# Patient Record
Sex: MALE | Race: WHITE | NOT HISPANIC OR LATINO | Employment: OTHER | URBAN - METROPOLITAN AREA
[De-identification: names, ages, dates, MRNs, and addresses within clinical notes are randomized per-mention and may not be internally consistent; named-entity substitution may affect disease eponyms.]

---

## 2017-03-20 ENCOUNTER — OFFICE VISIT (OUTPATIENT)
Dept: FAMILY MEDICINE CLINIC | Facility: CLINIC | Age: 77
End: 2017-03-20

## 2017-03-20 VITALS
TEMPERATURE: 97.8 F | OXYGEN SATURATION: 99 % | HEART RATE: 56 BPM | WEIGHT: 181 LBS | HEIGHT: 72 IN | DIASTOLIC BLOOD PRESSURE: 70 MMHG | BODY MASS INDEX: 24.52 KG/M2 | SYSTOLIC BLOOD PRESSURE: 132 MMHG

## 2017-03-20 DIAGNOSIS — R41.3 MEMORY IMPAIRMENT: ICD-10-CM

## 2017-03-20 DIAGNOSIS — I10 ESSENTIAL HYPERTENSION: Primary | ICD-10-CM

## 2017-03-20 PROCEDURE — 99213 OFFICE O/P EST LOW 20 MIN: CPT | Performed by: NURSE PRACTITIONER

## 2017-03-20 RX ORDER — ESCITALOPRAM OXALATE 20 MG/1
20 TABLET ORAL DAILY
Qty: 90 TABLET | Refills: 1 | Status: SHIPPED | OUTPATIENT
Start: 2017-03-20 | End: 2017-03-27 | Stop reason: SDUPTHER

## 2017-03-20 RX ORDER — PRAVASTATIN SODIUM 40 MG
40 TABLET ORAL DAILY
Qty: 90 TABLET | Refills: 1 | Status: SHIPPED | OUTPATIENT
Start: 2017-03-20 | End: 2017-04-18 | Stop reason: SDUPTHER

## 2017-03-20 RX ORDER — LORATADINE 10 MG/1
10 TABLET ORAL DAILY
Qty: 90 TABLET | Refills: 1 | Status: SHIPPED | OUTPATIENT
Start: 2017-03-20 | End: 2017-03-27 | Stop reason: SDUPTHER

## 2017-03-20 RX ORDER — CLOPIDOGREL BISULFATE 75 MG/1
75 TABLET ORAL DAILY
Qty: 90 TABLET | Refills: 1 | Status: SHIPPED | OUTPATIENT
Start: 2017-03-20 | End: 2017-03-27 | Stop reason: SDUPTHER

## 2017-03-20 RX ORDER — AMLODIPINE BESYLATE 5 MG/1
TABLET ORAL
Qty: 135 TABLET | Refills: 1 | Status: SHIPPED | OUTPATIENT
Start: 2017-03-20 | End: 2017-03-27 | Stop reason: SDUPTHER

## 2017-03-20 RX ORDER — OLOPATADINE HYDROCHLORIDE 2 MG/ML
1 SOLUTION/ DROPS OPHTHALMIC DAILY
Qty: 3 BOTTLE | Refills: 1 | Status: SHIPPED | OUTPATIENT
Start: 2017-03-20 | End: 2017-04-18 | Stop reason: SDUPTHER

## 2017-03-20 NOTE — PROGRESS NOTES
Subjective   Aakash Martinez is a 76 y.o. male.     HPI Comments: Patient's doing well    Recent neuropsych evaluation frustration    Follow-up neurology next month  No longer taking Aricept,  No longer taking medications for Parkinson's    Diagnoses and question    Blood pressures controlled    Pravachol for cholesterol    Depression anxiety better Lexapro    Still difficulty things you require steps  Such as     No longer drives      Hearing deficit  Doesn't want to wear hearing aids  Had them checked  Wife encouraging him to do so    May need glasses having checked out as well      Still has balance issues gait problems  Question as to if he has Parkinson's        Difficulty following simple step    May have 1 or 2 steps difficulty with frustration  Lexapro has helped dramatically    No longer takes Aricept discontinued by neurology         The following portions of the patient's history were reviewed and updated as appropriate: allergies, current medications, past family history, past social history, past surgical history and problem list.    Review of Systems   Constitutional: Negative.    HENT: Negative.    Respiratory: Negative.    Gastrointestinal: Negative.    Genitourinary: Negative.    Musculoskeletal: Positive for gait problem.   Neurological: Positive for numbness. Negative for dizziness, tremors, syncope, facial asymmetry, speech difficulty, light-headedness and headaches.   Psychiatric/Behavioral: The patient is nervous/anxious.        Objective   Physical Exam   Constitutional: He is oriented to person, place, and time. He appears well-developed.   HENT:   Head: Normocephalic.   Cardiovascular: Normal rate and regular rhythm.    Pulmonary/Chest: Effort normal and breath sounds normal.   Abdominal: Soft. Bowel sounds are normal.   Musculoskeletal:   Small steps  Possibly some shuffling  ?   Lymphadenopathy:     He has no cervical adenopathy.   Neurological: He is alert and oriented to person,  place, and time.   Psychiatric: His behavior is normal. Judgment and thought content normal.   Psychiatric normal pleasant alert oriented       Assessment/Plan   Aakash was seen today for follow-up.    Diagnoses and all orders for this visit:    Essential hypertension    Memory impairment    Other orders  -     pravastatin (PRAVACHOL) 40 MG tablet; Take 1 tablet by mouth Daily.  -     clopidogrel (PLAVIX) 75 MG tablet; Take 1 tablet by mouth Daily.  -     escitalopram (LEXAPRO) 20 MG tablet; Take 1 tablet by mouth Daily.  -     amLODIPine (NORVASC) 5 MG tablet; Take 1 1/2 tab daily  -     olopatadine (PATADAY) 0.2 % solution ophthalmic solution; Administer 1 drop to both eyes Daily. As needed eye allergies  -     loratadine (CLARITIN) 10 MG tablet; Take 1 tablet by mouth Daily. As needed for allergies                Continue Lexapro 20 mg  Continue same blood pressure medication  Follow-up with neurology  Please have results of neuropsychiatric testing sent to the office  Patient has some mild dementia suspect unlikely Alzheimer's however  Encouraged him to right some of the steps down, this will help him stay more independent  Falls precautions use cane  Recheck in 4 months otherwise

## 2017-03-27 RX ORDER — ESCITALOPRAM OXALATE 20 MG/1
20 TABLET ORAL DAILY
Qty: 90 TABLET | Refills: 1 | Status: SHIPPED | OUTPATIENT
Start: 2017-03-27 | End: 2017-03-27 | Stop reason: SDUPTHER

## 2017-03-27 RX ORDER — AMLODIPINE BESYLATE 5 MG/1
TABLET ORAL
Qty: 135 TABLET | Refills: 1 | Status: SHIPPED | OUTPATIENT
Start: 2017-03-27 | End: 2017-03-30 | Stop reason: SDUPTHER

## 2017-03-27 RX ORDER — CLOPIDOGREL BISULFATE 75 MG/1
75 TABLET ORAL DAILY
Qty: 90 TABLET | Refills: 1 | Status: SHIPPED | OUTPATIENT
Start: 2017-03-27 | End: 2017-03-27 | Stop reason: SDUPTHER

## 2017-03-27 RX ORDER — LORATADINE 10 MG/1
10 TABLET ORAL DAILY
Qty: 90 TABLET | Refills: 1 | Status: SHIPPED | OUTPATIENT
Start: 2017-03-27 | End: 2017-03-30 | Stop reason: SDUPTHER

## 2017-03-27 RX ORDER — LORATADINE 10 MG/1
10 TABLET ORAL DAILY
Qty: 90 TABLET | Refills: 1 | Status: SHIPPED | OUTPATIENT
Start: 2017-03-27 | End: 2017-03-27 | Stop reason: SDUPTHER

## 2017-03-27 RX ORDER — ESCITALOPRAM OXALATE 20 MG/1
20 TABLET ORAL DAILY
Qty: 90 TABLET | Refills: 1 | Status: SHIPPED | OUTPATIENT
Start: 2017-03-27 | End: 2017-03-30 | Stop reason: SDUPTHER

## 2017-03-27 RX ORDER — CLOPIDOGREL BISULFATE 75 MG/1
75 TABLET ORAL DAILY
Qty: 90 TABLET | Refills: 1 | Status: SHIPPED | OUTPATIENT
Start: 2017-03-27 | End: 2017-03-30 | Stop reason: SDUPTHER

## 2017-03-27 RX ORDER — AMLODIPINE BESYLATE 5 MG/1
TABLET ORAL
Qty: 135 TABLET | Refills: 1 | Status: SHIPPED | OUTPATIENT
Start: 2017-03-27 | End: 2017-03-27 | Stop reason: SDUPTHER

## 2017-03-30 RX ORDER — ESCITALOPRAM OXALATE 20 MG/1
20 TABLET ORAL DAILY
Qty: 90 TABLET | Refills: 1 | OUTPATIENT
Start: 2017-03-30 | End: 2017-04-18 | Stop reason: SDUPTHER

## 2017-03-30 RX ORDER — LORATADINE 10 MG/1
10 TABLET ORAL DAILY
Qty: 90 TABLET | Refills: 1 | OUTPATIENT
Start: 2017-03-30 | End: 2017-04-18 | Stop reason: SDUPTHER

## 2017-03-30 RX ORDER — CLOPIDOGREL BISULFATE 75 MG/1
75 TABLET ORAL DAILY
Qty: 90 TABLET | Refills: 1 | OUTPATIENT
Start: 2017-03-30 | End: 2017-04-18 | Stop reason: SDUPTHER

## 2017-03-30 RX ORDER — AMLODIPINE BESYLATE 5 MG/1
TABLET ORAL
Qty: 135 TABLET | Refills: 1 | OUTPATIENT
Start: 2017-03-30 | End: 2017-04-18 | Stop reason: SDUPTHER

## 2017-04-18 RX ORDER — AMLODIPINE BESYLATE 5 MG/1
TABLET ORAL
Qty: 135 TABLET | Refills: 1 | OUTPATIENT
Start: 2017-04-18 | End: 2017-04-19 | Stop reason: SDUPTHER

## 2017-04-18 RX ORDER — LORATADINE 10 MG/1
10 TABLET ORAL DAILY
Qty: 90 TABLET | Refills: 1 | OUTPATIENT
Start: 2017-04-18 | End: 2018-01-31

## 2017-04-18 RX ORDER — OMEPRAZOLE 20 MG/1
20 CAPSULE, DELAYED RELEASE ORAL DAILY
Qty: 90 CAPSULE | Refills: 2 | Status: SHIPPED | OUTPATIENT
Start: 2017-04-18 | End: 2017-10-19

## 2017-04-18 RX ORDER — CLOPIDOGREL BISULFATE 75 MG/1
75 TABLET ORAL DAILY
Qty: 90 TABLET | Refills: 1 | OUTPATIENT
Start: 2017-04-18 | End: 2017-04-19 | Stop reason: SDUPTHER

## 2017-04-18 RX ORDER — ESCITALOPRAM OXALATE 20 MG/1
20 TABLET ORAL DAILY
Qty: 90 TABLET | Refills: 1 | Status: SHIPPED | OUTPATIENT
Start: 2017-04-18 | End: 2017-10-19 | Stop reason: SDUPTHER

## 2017-04-18 RX ORDER — OLOPATADINE HYDROCHLORIDE 2 MG/ML
1 SOLUTION/ DROPS OPHTHALMIC DAILY
Qty: 3 BOTTLE | Refills: 1 | Status: SHIPPED | OUTPATIENT
Start: 2017-04-18 | End: 2018-01-31 | Stop reason: SDUPTHER

## 2017-04-18 RX ORDER — PRAVASTATIN SODIUM 40 MG
40 TABLET ORAL DAILY
Qty: 90 TABLET | Refills: 1 | Status: SHIPPED | OUTPATIENT
Start: 2017-04-18 | End: 2017-10-19 | Stop reason: SDUPTHER

## 2017-04-19 RX ORDER — CLOPIDOGREL BISULFATE 75 MG/1
75 TABLET ORAL DAILY
Qty: 90 TABLET | Refills: 1 | Status: SHIPPED | OUTPATIENT
Start: 2017-04-19 | End: 2017-10-19 | Stop reason: SDUPTHER

## 2017-04-19 RX ORDER — AMLODIPINE BESYLATE 5 MG/1
TABLET ORAL
Qty: 135 TABLET | Refills: 1 | Status: SHIPPED | OUTPATIENT
Start: 2017-04-19 | End: 2017-10-19 | Stop reason: SDUPTHER

## 2017-05-15 ENCOUNTER — OFFICE VISIT (OUTPATIENT)
Dept: FAMILY MEDICINE CLINIC | Facility: CLINIC | Age: 77
End: 2017-05-15

## 2017-05-15 VITALS
WEIGHT: 183 LBS | BODY MASS INDEX: 24.79 KG/M2 | SYSTOLIC BLOOD PRESSURE: 122 MMHG | DIASTOLIC BLOOD PRESSURE: 82 MMHG | TEMPERATURE: 97.9 F | HEART RATE: 46 BPM | HEIGHT: 72 IN | OXYGEN SATURATION: 98 %

## 2017-05-15 DIAGNOSIS — R26.81 GAIT INSTABILITY: ICD-10-CM

## 2017-05-15 DIAGNOSIS — R07.81 RIB PAIN ON RIGHT SIDE: ICD-10-CM
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
## 2017-05-15 DIAGNOSIS — I10 ESSENTIAL HYPERTENSION: ICD-10-CM

## 2017-05-15 PROCEDURE — 99213 OFFICE O/P EST LOW 20 MIN: CPT | Performed by: NURSE PRACTITIONER

## 2017-05-24 ENCOUNTER — HOSPITAL ENCOUNTER (OUTPATIENT)
Dept: PHYSICAL THERAPY | Facility: HOSPITAL | Age: 77
Setting detail: THERAPIES SERIES
Discharge: HOME | End: 2017-05-24

## 2017-05-24 DIAGNOSIS — Z91.81 RISK FOR FALLS: ICD-10-CM

## 2017-05-24 DIAGNOSIS — R26.89 BALANCE PROBLEM: ICD-10-CM

## 2017-05-24 DIAGNOSIS — R26.81 GAIT INSTABILITY: Primary | ICD-10-CM

## 2017-05-24 PROCEDURE — G8978 MOBILITY CURRENT STATUS: HCPCS | Performed by: PHYSICAL THERAPIST

## 2017-05-24 PROCEDURE — 97110 THERAPEUTIC EXERCISES: CPT | Performed by: PHYSICAL THERAPIST

## 2017-05-24 PROCEDURE — 97162 PT EVAL MOD COMPLEX 30 MIN: CPT | Performed by: PHYSICAL THERAPIST

## 2017-05-24 PROCEDURE — G8979 MOBILITY GOAL STATUS: HCPCS | Performed by: PHYSICAL THERAPIST

## 2017-05-26 ENCOUNTER — APPOINTMENT (OUTPATIENT)
Dept: PHYSICAL THERAPY | Facility: HOSPITAL | Age: 77
End: 2017-05-26

## 2017-05-30 ENCOUNTER — APPOINTMENT (OUTPATIENT)
Dept: PHYSICAL THERAPY | Facility: HOSPITAL | Age: 77
End: 2017-05-30

## 2017-06-01 ENCOUNTER — HOSPITAL ENCOUNTER (OUTPATIENT)
Dept: PHYSICAL THERAPY | Facility: HOSPITAL | Age: 77
Setting detail: THERAPIES SERIES
Discharge: HOME | End: 2017-06-01

## 2017-06-01 DIAGNOSIS — R26.81 GAIT INSTABILITY: Primary | ICD-10-CM

## 2017-06-01 DIAGNOSIS — R26.89 BALANCE PROBLEM: ICD-10-CM

## 2017-06-01 DIAGNOSIS — Z91.81 RISK FOR FALLS: ICD-10-CM

## 2017-06-01 PROCEDURE — 97110 THERAPEUTIC EXERCISES: CPT | Performed by: PHYSICAL THERAPIST

## 2017-06-01 PROCEDURE — 97112 NEUROMUSCULAR REEDUCATION: CPT | Performed by: PHYSICAL THERAPIST

## 2017-06-01 PROCEDURE — 97116 GAIT TRAINING THERAPY: CPT | Performed by: PHYSICAL THERAPIST

## 2017-06-01 NOTE — THERAPY TREATMENT NOTE
Outpatient Physical Therapy Ortho Treatment Note  [facility]     Patient Name: Aakash Martinez  : 1940  MRN: [id number]  Today's Date: 2017      Visit Date: 2017    Visit Dx:    ICD-10-CM ICD-9-CM   1. Gait instability R26.81 781.2   2. Balance problem R26.89 781.99   3. Risk for falls Z91.81 V15.88       Patient Active Problem List   Diagnosis   • Abnormal computerized tomography of brain   • Cognitive complaints   • Mixed anxiety depressive disorder   • Hypertension   • Obstructive sleep apnea syndrome   • Parkinsonism   • Depression   • Fatigue   • Memory impairment   • Peripheral neuropathy   • Sleep disorder   • Hypercholesterolemia   • Gait instability   • Falls        Past Medical History:   Diagnosis Date   • Acid reflux    • Angioedema 2016   • Aortic sclerosis    • Arthritis    • Depression    • ED (erectile dysfunction) of non-organic origin    • Hypercholesterolemia    • Hypertension    • Renal colic, bilateral     both kidneys        Past Surgical History:   Procedure Laterality Date   • HERNIA REPAIR Left     inguinal sliding, left with mesh   • NECK SURGERY     • OTHER SURGICAL HISTORY      RENAL LITHOTRIPSY   • TONSILLECTOMY                               PT Assessment/Plan       17 0922       PT Assessment    Assessment Comments Patient needs nearly constant cuing for performance of ex and to stay focused at times.  Attempted to perform alt UE with alt hip flexion in sitting but had extreme difficulty with this.  Encouraged to work on STS at home from higher surface but still a challenge vs working on it from a standard chair.  Consider adding israel israel, alt UE/LE arm swing in // bars, postural strengthening, activity to encourage less stiffness/rigidity and progress balance work with reduced UE support, consider semi tandem/tandem stance on level ground/foam pad, possibly trial of static balance on foam with eyes closed. Patient requires CG/SBA for safety and may  "progress out of // bars but would use gait belt and likely min/CGA for safety.    -RA     PT Plan    PT Plan Comments Continue skilled PT for core/LE strengthening, ROM/flexibility, transfer and balance training for improved safety with functional mobility/gait.  -RA       User Key  (r) = Recorded By, (t) = Taken By, (c) = Cosigned By    Initials Name Provider Type    MICHELLE Torres, PT Physical Therapist                    Exercises       06/01/17 0900          Subjective Comments    Subjective Comments spouse reports he's been doing ex except has real difficulty with STS.  -RA      Subjective Pain    Able to rate subjective pain? yes  -RA      Pre-Treatment Pain Level 3  -RA      Exercise 1    Exercise Name 1 See flowsheet in chart for ex details  -RA      Exercise 2    Exercise Name 2 --  -RA      Equipment 2 --  -RA      Resistance 2 --  -RA      Exercise 3    Exercise Name 3 seated LAQ, alt hip flexion (march)  -RA      Exercise 4    Exercise Name 4 sit<->stand with hands on thighs  -RA      Exercise 5    Exercise Name 5 : heel to toe, big steps, sidestepping, high knees, tandem 3-6 laps with 0-2 UE assist as needed   -RA      Exercise 6    Exercise Name 6 step taps, step overs 4\" 1UE support  -RA      Exercise 7    Exercise Name 7 blue foam, static balance, static balance NBOS, UE ball rotation, UE ball OH reach   -RA      Exercise 8    Exercise Name 8 standing heel/toe raises, standing hip abd w/ YTB   -RA        User Key  (r) = Recorded By, (t) = Taken By, (c) = Cosigned By    Initials Name Provider Type    MICHELLE Torres, PT Physical Therapist                               PT OP Goals       06/01/17 0900       PT Short Term Goals    STG 1 Patient will be independent and compliant with initial HEP for posture, ROM/flexibility, and basic strengthening.   -RA     STG 1 Progress Ongoing  -RA     STG 2 Patient/spouse will report >/= 25% increased ease with ADL's/transitional movements  -RA     STG 2 " Progress Ongoing  -RA     STG 3 Patient will demonstrate improving core/LE extremity strength and balance by improvement in the 5 Times Sit to Stand Test from 33 seconds to </= 28 seconds   -RA     STG 3 Progress Ongoing  -RA     STG 4 Patient will improve REEVES balance score to >/= 38/56  -RA     STG 4 Progress Ongoing  -RA     STG 5 Patient will be educated on and demonstrate ability to transition from floor to standing independently for increased ease getting up from floor/ground  -RA     STG 5 Progress Ongoing  -RA     Long Term Goals    LTG 1 Patient/spouse will be independent with established HEP for strength/stabilization and balance to facilitate self management of condition  -RA     LTG 1 Progress Ongoing  -RA     LTG 2 Patient will demonstrate improving core/LE extremity strength and balance by improvement in the 5 Times Sit to Stand Test from 33 seconds to </= 24 seconds   -RA     LTG 2 Progress Ongoing  -RA     LTG 3 Patient will show decreased falls risk and improved safety with functional movements by improvement in the Times Get Up and Go Test from 26 seconds to </= 18 seconds   -RA     LTG 3 Progress Ongoing  -RA     LTG 4 Patient will report reduced functional limitations on LEFS with score >= 34/80.  -RA     LTG 4 Progress Ongoing  -RA     LTG 5 Patient will improve REEVES balance score to >/= 44/56  -RA     LTG 5 Progress Ongoing  -RA     LTG 6 Patient will demonstrate improvement in gait mechanics to allow him to safely ambulate >/= 300' during 2 minute walk test  -     LTG 6 Progress Ongoing  -RA       User Key  (r) = Recorded By, (t) = Taken By, (c) = Cosigned By    Initials Name Provider Type    RA Suly Torres PT Physical Therapist                Therapy Education       06/01/17 0969          Therapy Education    Given Fall prevention and home safety;Mobility training;Posture/body mechanics  -RA      Program Reinforced  -RA      How Provided Verbal;Demonstration  -RA      Provided to  Patient  -RA      Level of Understanding Teach back education performed;Verbalized  -RA        User Key  (r) = Recorded By, (t) = Taken By, (c) = Cosigned By    Initials Name Provider Type    RA Suly Torres PT Physical Therapist                Time Calculation:   Start Time: 0921  Stop Time: 1012  Time Calculation (min): 51 min    Therapy Charges for Today     Code Description Service Date Service Provider Modifiers Qty    68242531526 HC GAIT TRAINING EA 15 MIN 6/1/2017 Suly Torres, PT GP 1    90226992037 HC PT THER PROC EA 15 MIN 6/1/2017 Suly Torres, PT GP 1    78125723594 HC PT NEUROMUSC RE EDUCATION EA 15 MIN 6/1/2017 Suly Torres, PT GP 1                    Suly Torres, PT  6/1/2017

## 2017-06-06 ENCOUNTER — APPOINTMENT (OUTPATIENT)
Dept: PHYSICAL THERAPY | Facility: HOSPITAL | Age: 77
End: 2017-06-06

## 2017-06-08 ENCOUNTER — HOSPITAL ENCOUNTER (OUTPATIENT)
Dept: PHYSICAL THERAPY | Facility: HOSPITAL | Age: 77
Setting detail: THERAPIES SERIES
Discharge: HOME | End: 2017-06-08

## 2017-06-08 DIAGNOSIS — Z91.81 RISK FOR FALLS: ICD-10-CM

## 2017-06-08 DIAGNOSIS — R26.81 GAIT INSTABILITY: Primary | ICD-10-CM

## 2017-06-08 DIAGNOSIS — R26.89 BALANCE PROBLEM: ICD-10-CM

## 2017-06-08 PROCEDURE — 97110 THERAPEUTIC EXERCISES: CPT

## 2017-06-08 PROCEDURE — 97116 GAIT TRAINING THERAPY: CPT

## 2017-06-08 NOTE — THERAPY TREATMENT NOTE
"    Outpatient Physical Therapy Ortho Treatment Note  [facility]     Patient Name: Aakash Martinez  : 1940  MRN: [id number]  Today's Date: 2017      Visit Date: 2017    Visit Dx:    ICD-10-CM ICD-9-CM   1. Gait instability R26.81 781.2   2. Balance problem R26.89 781.99   3. Risk for falls Z91.81 V15.88       Patient Active Problem List   Diagnosis   • Abnormal computerized tomography of brain   • Cognitive complaints   • Mixed anxiety depressive disorder   • Hypertension   • Obstructive sleep apnea syndrome   • Parkinsonism   • Depression   • Fatigue   • Memory impairment   • Peripheral neuropathy   • Sleep disorder   • Hypercholesterolemia   • Gait instability   • Falls        Past Medical History:   Diagnosis Date   • Acid reflux    • Angioedema 2016   • Aortic sclerosis    • Arthritis    • Depression    • ED (erectile dysfunction) of non-organic origin    • Hypercholesterolemia    • Hypertension    • Renal colic, bilateral     both kidneys        Past Surgical History:   Procedure Laterality Date   • HERNIA REPAIR Left     inguinal sliding, left with mesh   • NECK SURGERY     • OTHER SURGICAL HISTORY      RENAL LITHOTRIPSY   • TONSILLECTOMY                               PT Assessment/Plan       17 1154       PT Assessment    Assessment Comments Pt continues to require near constant cuing during gait and balance ex's.  He was able to perform sit--stand from 23\" seat height today without UE support. Pt will benefit from working on reciprocal arm swing and UE/LE coordination as well as ther ex and balance/gait training.  He would benefit from working on floor transfers and would likely require assist of 2 for safety at first.  -DEAN     PT Plan    PT Plan Comments Nustep UE/LE, ther ex, gait and balance training in bars (if out of bars add gait belt) sit--stand, may add floor transfers soon  -DEAN       User Key  (r) = Recorded By, (t) = Taken By, (c) = Cosigned By    Initials Name " "Provider Type    DEAN Matos, PT Physical Therapist                    Exercises       06/08/17 1000          Subjective Comments    Subjective Comments pt noted some discomfort in shoulders due to weather  -DEAN      Subjective Pain    Able to rate subjective pain? yes  -DEAN      Exercise 1    Exercise Name 1 See flowsheet in chart for ex details  -DEAN        User Key  (r) = Recorded By, (t) = Taken By, (c) = Cosigned By    Initials Name Provider Type    DEAN Matos, PT Physical Therapist                               PT OP Goals       06/08/17 0945       PT Short Term Goals    STG 1 Patient will be independent and compliant with initial HEP for posture, ROM/flexibility, and basic strengthening.   -DEAN     STG 1 Progress Ongoing  -     STG 1 Progress Comments requires 100% cuing  -DEAN     STG 2 Patient/spouse will report >/= 25% increased ease with ADL's/transitional movements  -DEAN     STG 2 Progress Ongoing  -     STG 2 Progress Comments pt demonstrated sit--stand from 23\" height mat table without using UE's  -DEAN     STG 3 Patient will demonstrate improving core/LE extremity strength and balance by improvement in the 5 Times Sit to Stand Test from 33 seconds to </= 28 seconds   -DEAN     STG 3 Progress Ongoing  -     STG 4 Patient will improve REEVES balance score to >/= 38/56  -DEAN     STG 4 Progress Ongoing  -     STG 5 Patient will be educated on and demonstrate ability to transition from floor to standing independently for increased ease getting up from floor/ground  -DEAN     STG 5 Progress Ongoing  -     Long Term Goals    LTG 1 Patient/spouse will be independent with established HEP for strength/stabilization and balance to facilitate self management of condition  -DEAN     LTG 1 Progress Ongoing  -DEAN     LTG 2 Patient will demonstrate improving core/LE extremity strength and balance by improvement in the 5 Times Sit to Stand Test from 33 seconds to </= 24 seconds   -DEAN     LTG 2 Progress " Ongoing  -DEAN     LTG 3 Patient will show decreased falls risk and improved safety with functional movements by improvement in the Times Get Up and Go Test from 26 seconds to </= 18 seconds   -DEAN     LTG 3 Progress Ongoing  -DEAN     LTG 4 Patient will report reduced functional limitations on LEFS with score >= 34/80.  -JA     LTG 4 Progress Ongoing  -DEAN     LTG 5 Patient will improve REEVES balance score to >/= 44/56  -DEAN     LTG 5 Progress Ongoing  -DEAN     LTG 6 Patient will demonstrate improvement in gait mechanics to allow him to safely ambulate >/= 300' during 2 minute walk test  -DEAN     LTG 6 Progress Ongoing  -DEAN       User Key  (r) = Recorded By, (t) = Taken By, (c) = Cosigned By    Initials Name Provider Type    DEAN Matos PT Physical Therapist                Therapy Education       06/08/17 0945          Therapy Education    Given Fall prevention and home safety;Mobility training;Posture/body mechanics   Required copious cuing, nearly continuous with gait and balance training, has difficulty following directions.  -DEAN      Program Progressed  -DEAN      How Provided Verbal;Demonstration  -DEAN      Provided to Patient  -DEAN      Level of Understanding Teach back education performed  -DEAN        User Key  (r) = Recorded By, (t) = Taken By, (c) = Cosigned By    Initials Name Provider Type    DEAN Matos PT Physical Therapist                Time Calculation:   Start Time: 0945  Stop Time: 1030  Time Calculation (min): 45 min    Therapy Charges for Today     Code Description Service Date Service Provider Modifiers Qty    66593864772 HC PT THER PROC EA 15 MIN 6/8/2017 Addie Matos, PT GP 2    76774965809 HC GAIT TRAINING EA 15 MIN 6/8/2017 Addie Matos PT GP 1                    Addie Matos PT  6/8/2017

## 2017-06-13 ENCOUNTER — HOSPITAL ENCOUNTER (OUTPATIENT)
Dept: PHYSICAL THERAPY | Facility: HOSPITAL | Age: 77
Setting detail: THERAPIES SERIES
Discharge: HOME | End: 2017-06-13

## 2017-06-13 DIAGNOSIS — Z91.81 RISK FOR FALLS: ICD-10-CM

## 2017-06-13 DIAGNOSIS — R26.89 BALANCE PROBLEM: ICD-10-CM

## 2017-06-13 DIAGNOSIS — R26.81 GAIT INSTABILITY: Primary | ICD-10-CM

## 2017-06-13 PROCEDURE — 97112 NEUROMUSCULAR REEDUCATION: CPT

## 2017-06-13 PROCEDURE — 97116 GAIT TRAINING THERAPY: CPT

## 2017-06-13 PROCEDURE — 97110 THERAPEUTIC EXERCISES: CPT

## 2017-06-14 NOTE — THERAPY TREATMENT NOTE
Outpatient Physical Therapy Ortho Treatment Note  [facility]     Patient Name: Akaash Martinez  : 1940  MRN: [id number]  Today's Date: 2017      Visit Date: 2017    Visit Dx:    ICD-10-CM ICD-9-CM   1. Gait instability R26.81 781.2   2. Balance problem R26.89 781.99   3. Risk for falls Z91.81 V15.88       Patient Active Problem List   Diagnosis   • Abnormal computerized tomography of brain   • Cognitive complaints   • Mixed anxiety depressive disorder   • Hypertension   • Obstructive sleep apnea syndrome   • Parkinsonism   • Depression   • Fatigue   • Memory impairment   • Peripheral neuropathy   • Sleep disorder   • Hypercholesterolemia   • Gait instability   • Falls        Past Medical History:   Diagnosis Date   • Acid reflux    • Angioedema 2016   • Aortic sclerosis    • Arthritis    • Depression    • ED (erectile dysfunction) of non-organic origin    • Hypercholesterolemia    • Hypertension    • Renal colic, bilateral     both kidneys        Past Surgical History:   Procedure Laterality Date   • HERNIA REPAIR Left     inguinal sliding, left with mesh   • NECK SURGERY     • OTHER SURGICAL HISTORY      RENAL LITHOTRIPSY   • TONSILLECTOMY                               PT Assessment/Plan       17 1030       PT Assessment    Assessment Comments Pt fell yesterday but was poor historian as to cause of fall and whether he fell against a piece of furniture v. the floor because he did not recall getting up from the floor and mentions a table where his CPAP machine is.  Pt's wife did not see the fall.  Did not start floor transfer training due to increased soreness in ribs today.  -DEAN     PT Plan    PT Plan Comments NuStep UE/LE, gait and balance training in bars and transition to [facility] (use gait belt), ther ex for LE strengthening, sit--stand and floor transfers.  -DEAN       User Key  (r) = Recorded By, (t) = Taken By, (c) = Cosigned By    Initials Name Provider Type    DEAN  Addie Matos, PT Physical Therapist                    Exercises       06/13/17 1000          Subjective Comments    Subjective Comments Pt 'swife states pt fell yesterday and hit R side ribs--may be sore.  -DEAN      Subjective Pain    Able to rate subjective pain? yes  -DEAN      Exercise 1    Exercise Name 1 Began w/ Nustep, See flowsheet in chart for ex details  -DEAN        User Key  (r) = Recorded By, (t) = Taken By, (c) = Cosigned By    Initials Name Provider Type    DEAN Matos, PT Physical Therapist                               PT OP Goals       06/13/17 1030       PT Short Term Goals    STG 1 Patient will be independent and compliant with initial HEP for posture, ROM/flexibility, and basic strengthening.   -DEAN     STG 1 Progress Ongoing;Progressing  -DEAN     STG 1 Progress Comments requires significant cuing  -DEAN     STG 2 Patient/spouse will report >/= 25% increased ease with ADL's/transitional movements  -DEAN     STG 2 Progress Ongoing  -     STG 3 Patient will demonstrate improving core/LE extremity strength and balance by improvement in the 5 Times Sit to Stand Test from 33 seconds to </= 28 seconds   -DEAN     STG 3 Progress Ongoing  -     STG 4 Patient will improve REEVES balance score to >/= 38/56  -     STG 4 Progress Ongoing  -     STG 5 Patient will be educated on and demonstrate ability to transition from floor to standing independently for increased ease getting up from floor/ground  -     STG 5 Progress Ongoing  -     STG 5 Progress Comments unable to start today due to pt falling yesterday and has bruise on R ribs and increased soreness  -DEAN     Long Term Goals    LTG 1 Patient/spouse will be independent with established HEP for strength/stabilization and balance to facilitate self management of condition  -DEAN     LTG 1 Progress Ongoing  -     LTG 2 Patient will demonstrate improving core/LE extremity strength and balance by improvement in the 5 Times Sit to Stand Test from  33 seconds to </= 24 seconds   -JA     LTG 2 Progress Ongoing  -JA     LTG 2 Progress Comments worked on sit--stand from lower seat heights, pt able to stand without UE support if he scoots forward in chair first  -JA     LTG 3 Patient will show decreased falls risk and improved safety with functional movements by improvement in the Times Get Up and Go Test from 26 seconds to </= 18 seconds   -JA     LTG 3 Progress Ongoing  -JA     LTG 4 Patient will report reduced functional limitations on LEFS with score >= 34/80.  -JA     LTG 4 Progress Ongoing  -JA     LTG 5 Patient will improve REEVES balance score to >/= 44/56  -JA     LTG 5 Progress Ongoing  -JA     LTG 6 Patient will demonstrate improvement in gait mechanics to allow him to safely ambulate >/= 300' during 2 minute walk test  -     LTG 6 Progress Ongoing  -DEAN       User Key  (r) = Recorded By, (t) = Taken By, (c) = Cosigned By    Initials Name Provider Type    DEAN Matos PT Physical Therapist                Therapy Education       06/13/17 1107          Therapy Education    Given Fall prevention and home safety;Mobility training;Posture/body mechanics   Advised pt and his wife to check their home for cords and throw rugs and other potential obstacles that could trip pt/  -DEAN      Program Progressed  -DEAN      How Provided Verbal;Demonstration  -DEAN      Provided to Patient  -DEAN      Level of Understanding Teach back education performed  -DEAN        User Key  (r) = Recorded By, (t) = Taken By, (c) = Cosigned By    Initials Name Provider Type    DEAN Matos PT Physical Therapist                Time Calculation:   Start Time: 1030  Stop Time: 1115  Time Calculation (min): 45 min    Therapy Charges for Today     Code Description Service Date Service Provider Modifiers Qty    79079158301  PT THER PROC EA 15 MIN 6/13/2017 Addie Matos, PT GP 1    [id number]  PT NEUROMUSC RE EDUCATION EA 15 MIN 6/13/2017 Addie Matos, PT GP 1     [id number]  GAIT TRAINING EA 15 MIN 6/13/2017 Addie Matos, PT GP 1                    Addie Matos, PT  6/13/2017

## 2017-06-15 ENCOUNTER — HOSPITAL ENCOUNTER (OUTPATIENT)
Dept: PHYSICAL THERAPY | Facility: HOSPITAL | Age: 77
Setting detail: THERAPIES SERIES
Discharge: HOME | End: 2017-06-15

## 2017-06-15 DIAGNOSIS — R26.81 GAIT INSTABILITY: Primary | ICD-10-CM

## 2017-06-15 DIAGNOSIS — Z91.81 RISK FOR FALLS: ICD-10-CM

## 2017-06-15 DIAGNOSIS — R26.89 BALANCE PROBLEM: ICD-10-CM

## 2017-06-15 PROCEDURE — 97116 GAIT TRAINING THERAPY: CPT | Performed by: PHYSICAL THERAPIST

## 2017-06-15 PROCEDURE — 97110 THERAPEUTIC EXERCISES: CPT | Performed by: PHYSICAL THERAPIST

## 2017-06-15 PROCEDURE — 97112 NEUROMUSCULAR REEDUCATION: CPT | Performed by: PHYSICAL THERAPIST

## 2017-06-15 NOTE — THERAPY TREATMENT NOTE
Outpatient Physical Therapy Ortho Treatment Note  [facility]     Patient Name: Aakash Martinez  : 1940  MRN: [id number]  Today's Date: 6/15/2017      Visit Date: 06/15/2017    Visit Dx:    ICD-10-CM ICD-9-CM   1. Gait instability R26.81 781.2   2. Balance problem R26.89 781.99   3. Risk for falls Z91.81 V15.88       Patient Active Problem List   Diagnosis   • Abnormal computerized tomography of brain   • Cognitive complaints   • Mixed anxiety depressive disorder   • Hypertension   • Obstructive sleep apnea syndrome   • Parkinsonism   • Depression   • Fatigue   • Memory impairment   • Peripheral neuropathy   • Sleep disorder   • Hypercholesterolemia   • Gait instability   • Falls        Past Medical History:   Diagnosis Date   • Acid reflux    • Angioedema 2016   • Aortic sclerosis    • Arthritis    • Depression    • ED (erectile dysfunction) of non-organic origin    • Hypercholesterolemia    • Hypertension    • Renal colic, bilateral     both kidneys        Past Surgical History:   Procedure Laterality Date   • HERNIA REPAIR Left     inguinal sliding, left with mesh   • NECK SURGERY     • OTHER SURGICAL HISTORY      RENAL LITHOTRIPSY   • TONSILLECTOMY               06/15/17 1200   Subjective Comments   Subjective Comments Per pt. and wife, pt. appears to be healing from fall, denies need for medical follow up.    Exercise 1   Exercise Name 1 Began w/ Smitha, See flowsheet in chart for ex details           06/15/17 1201   Therapy Education   Given Fall prevention and home safety;Mobility training;Posture/body mechanics  (Importance of heel strike, step length. To wife about signs to follow up with medical professional if indicated. )   Program Reinforced   How Provided Verbal;Demonstration   Provided to Patient   Level of Understanding Teach back education performed                   PT Assessment/Plan       06/15/17 1152       PT Assessment    Assessment Comments Pt with large cognitive and memory  deficits that limit carryover with therapy activities, limits overall prognosis. Pt. requires near constant cuing for staying on task. Worked on single leg dynamic work on stairs (// bars occupied) as well as gait in clinic for heel strike, arm swing, and step length. Pt. may benefit from continued therapy with focus on strength and muscle memory for optimal carryover.   -KJ     PT Plan    PT Plan Comments Continue to focus on gait, strengthening, sit to stand, functional mobility and strength.   -KJ       User Key  (r) = Recorded By, (t) = Taken By, (c) = Cosigned By    Initials Name Provider Type    MUNDO Martin, PT Physical Therapist                                               Time Calculation:   Start Time: 1112 (pt. arrived at 1111 for 1100 appt)  Stop Time: 1151  Time Calculation (min): 39 min    Therapy Charges for Today     Code Description Service Date Service Provider Modifiers Qty    65033117238  PT THER PROC EA 15 MIN 6/15/2017 Evelina Martin, PT GP 1    10568637438 HC GAIT TRAINING EA 15 MIN 6/15/2017 Evelina Martin, PT GP 1    [id number]  PT NEUROMUSC RE EDUCATION EA 15 MIN 6/15/2017 Evelina Martin, PT GP 1                    Evelina Martin, PT  6/15/2017

## 2017-06-20 ENCOUNTER — HOSPITAL ENCOUNTER (OUTPATIENT)
Dept: PHYSICAL THERAPY | Facility: HOSPITAL | Age: 77
Setting detail: THERAPIES SERIES
Discharge: HOME | End: 2017-06-20

## 2017-06-20 DIAGNOSIS — R26.89 BALANCE PROBLEM: ICD-10-CM

## 2017-06-20 DIAGNOSIS — Z91.81 RISK FOR FALLS: ICD-10-CM

## 2017-06-20 DIAGNOSIS — R26.81 GAIT INSTABILITY: Primary | ICD-10-CM

## 2017-06-20 PROCEDURE — 97112 NEUROMUSCULAR REEDUCATION: CPT

## 2017-06-20 PROCEDURE — 97110 THERAPEUTIC EXERCISES: CPT

## 2017-06-20 NOTE — THERAPY TREATMENT NOTE
Outpatient Physical Therapy Ortho Treatment Note  [facility]     Patient Name: Aakash Martinez  : 1940  MRN: [id number]  Today's Date: 2017      Visit Date: 2017    Visit Dx:    ICD-10-CM ICD-9-CM   1. Gait instability R26.81 781.2   2. Balance problem R26.89 781.99   3. Risk for falls Z91.81 V15.88       Patient Active Problem List   Diagnosis   • Abnormal computerized tomography of brain   • Cognitive complaints   • Mixed anxiety depressive disorder   • Hypertension   • Obstructive sleep apnea syndrome   • Parkinsonism   • Depression   • Fatigue   • Memory impairment   • Peripheral neuropathy   • Sleep disorder   • Hypercholesterolemia   • Gait instability   • Falls        Past Medical History:   Diagnosis Date   • Acid reflux    • Angioedema 2016   • Aortic sclerosis    • Arthritis    • Depression    • ED (erectile dysfunction) of non-organic origin    • Hypercholesterolemia    • Hypertension    • Renal colic, bilateral     both kidneys        Past Surgical History:   Procedure Laterality Date   • HERNIA REPAIR Left     inguinal sliding, left with mesh   • NECK SURGERY     • OTHER SURGICAL HISTORY      RENAL LITHOTRIPSY   • TONSILLECTOMY                               PT Assessment/Plan       17 1200       PT Assessment    Assessment Comments Pt requires copious verbal cues for stride length, posture, and arm swing with gait.  His heel strike improves with longer step length, his gait will slow and his stride shortens almost to a shuffle at times.  -DEAN     PT Plan    PT Plan Comments focus on gait, strengthening, sit--stand , and functional mobility; plan to work on floor transfers if able  -DEAN       User Key  (r) = Recorded By, (t) = Taken By, (c) = Cosigned By    Initials Name Provider Type    DEAN Matos, PT Physical Therapist                    Exercises       17 1100          Subjective Comments    Subjective Comments It's coming along (rib soreness).  -DEAN       Exercise 1    Exercise Name 1 Began w/ Nustep, See flowsheet in chart for ex details  -DEAN        User Key  (r) = Recorded By, (t) = Taken By, (c) = Cosigned By    Initials Name Provider Type    DEAN Matos PT Physical Therapist                                   Therapy Education       06/20/17 1115          Therapy Education    Given Fall prevention and home safety;Mobility training   worked on arm swing with gait--pt has difficulty with reciprocal movement. also worked on upright posture he rounds shoulders significantly  -DEAN      Program Reinforced  -DEAN      How Provided Verbal;Demonstration  -DEAN      Provided to Patient  -DEAN      Level of Understanding Teach back education performed  -DEAN        User Key  (r) = Recorded By, (t) = Taken By, (c) = Cosigned By    Initials Name Provider Type    DEAN Matos PT Physical Therapist                Time Calculation:   Start Time: 1115  Stop Time: 1200  Time Calculation (min): 45 min    Therapy Charges for Today     Code Description Service Date Service Provider Modifiers Qty    46176526806  PT THER PROC EA 15 MIN 6/20/2017 Addie Matos, PT GP 2    [id number]  PT NEUROMUSC RE EDUCATION EA 15 MIN 6/20/2017 Addie Matos PT GP 1                    Addie Matos PT  6/20/2017

## 2017-06-22 ENCOUNTER — HOSPITAL ENCOUNTER (OUTPATIENT)
Dept: PHYSICAL THERAPY | Facility: HOSPITAL | Age: 77
Setting detail: THERAPIES SERIES
Discharge: HOME | End: 2017-06-22

## 2017-06-22 DIAGNOSIS — R26.81 GAIT INSTABILITY: Primary | ICD-10-CM
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
## 2017-06-22 DIAGNOSIS — R26.89 BALANCE PROBLEM: ICD-10-CM

## 2017-06-22 DIAGNOSIS — Z91.81 RISK FOR FALLS: ICD-10-CM

## 2017-06-22 PROCEDURE — 97112 NEUROMUSCULAR REEDUCATION: CPT

## 2017-06-22 PROCEDURE — 97110 THERAPEUTIC EXERCISES: CPT

## 2017-06-22 PROCEDURE — G8978 MOBILITY CURRENT STATUS: HCPCS

## 2017-06-22 PROCEDURE — G8979 MOBILITY GOAL STATUS: HCPCS

## 2017-06-22 NOTE — THERAPY RE-EVALUATION
Outpatient Physical Therapy Ortho Re-Evaluation  [facility]     Patient Name: Aakash Martinez  : 1940  MRN: [id number]  Today's Date: 2017      Visit Date: 2017    Patient Active Problem List   Diagnosis   • Abnormal computerized tomography of brain   • Cognitive complaints   • Mixed anxiety depressive disorder   • Hypertension   • Obstructive sleep apnea syndrome   • Parkinsonism   • Depression   • Fatigue   • Memory impairment   • Peripheral neuropathy   • Sleep disorder   • Hypercholesterolemia   • Gait instability   • Falls        Past Medical History:   Diagnosis Date   • Acid reflux    • Angioedema 2016   • Aortic sclerosis    • Arthritis    • Depression    • ED (erectile dysfunction) of non-organic origin    • Hypercholesterolemia    • Hypertension    • Renal colic, bilateral     both kidneys        Past Surgical History:   Procedure Laterality Date   • HERNIA REPAIR Left     inguinal sliding, left with mesh   • NECK SURGERY     • OTHER SURGICAL HISTORY      RENAL LITHOTRIPSY   • TONSILLECTOMY         Visit Dx:     ICD-10-CM ICD-9-CM   1. Gait instability R26.81 781.2   2. Balance problem R26.89 781.99   3. Risk for falls Z91.81 V15.88   4. Falls, subsequent encounter W19.XXXD V58.89     E888.9                                 Therapy Education       17 1610          Therapy Education    Given HEP;Mobility training   Discussed with pt and caregiver to cue for arm swing and issued standing exercises for home.  Added standing ex's to HEP: heel raises, hip abd, and hip ext.  -DEAN      Program Progressed  -DEAN      How Provided Verbal;Demonstration;Written  -DEAN      Provided to Patient;Caregiver  -DEAN      Level of Understanding Teach back education performed  -DEAN        User Key  (r) = Recorded By, (t) = Taken By, (c) = Cosigned By    Initials Name Provider Type    DEAN Matos, PT Physical Therapist                PT OP Goals       17 1530       PT Short Term Goals     STG 1 Patient will be independent and compliant with initial HEP for posture, ROM/flexibility, and basic strengthening.   -     STG 1 Progress Met  -     STG 1 Progress Comments pt can demonstrate ex's correctly on initial HEP, pt;s wife states he does them every day.  -     STG 2 Patient/spouse will report >/= 25% increased ease with ADL's/transitional movements  -     STG 2 Progress Ongoing  -     STG 3 Patient will demonstrate improving core/LE extremity strength and balance by improvement in the 5 Times Sit to Stand Test from 33 seconds to </= 28 seconds   -JA     STG 3 Progress Progressing  -     STG 3 Progress Comments 30 sec  -     STG 4 Patient will improve REEVES balance score to >/= 38/56  -     STG 4 Progress Progressing  -     STG 4 Progress Comments --  -     STG 5 Patient will be educated on and demonstrate ability to transition from floor to standing independently for increased ease getting up from floor/ground  -     STG 5 Progress Ongoing  -     Long Term Goals    LTG 1 Patient/spouse will be independent with established HEP for strength/stabilization and balance to facilitate self management of condition  -     LTG 1 Progress Ongoing  -     LTG 2 Patient will demonstrate improving core/LE extremity strength and balance by improvement in the 5 Times Sit to Stand Test from 33 seconds to </= 24 seconds   -     LTG 2 Progress Ongoing  -     LTG 3 Patient will show decreased falls risk and improved safety with functional movements by improvement in the Times Get Up and Go Test from 26 seconds to </= 18 seconds   -     LTG 3 Progress Ongoing  -     LTG 3 Progress Comments one week without falling  -     LTG 4 Patient will report reduced functional limitations on LEFS with score >= 34/80.  -     LTG 4 Progress Ongoing  -     LTG 5 Patient will improve REEVES balance score to >/= 44/56  -     LTG 5 Progress Ongoing;Progressing  -     LTG 5 Progress Comments  36/56  -     LTG 6 Patient will demonstrate improvement in gait mechanics to allow him to safely ambulate >/= 300' during 2 minute walk test  -     LTG 6 Progress Ongoing;Progressing  -[facility] 6 Progress Comments beginning to swing arms intermittently in gait  -       User Key  (r) = Recorded By, (t) = Taken By, (c) = Cosigned By    Initials Name Provider Type    DAEN Matos, PT Physical Therapist                PT Assessment/Plan       06/22/17 1530       PT Assessment    Functional Limitations Impaired gait;Impaired locomotion;Decreased safety during functional activities;Limitation in home management;Performance in leisure activities  -     Impairments Balance;Gait;Posture;Impaired flexibility;Cognition;Muscle strength  -     Assessment Comments  Pt has been seen for a total of 7 vistis for gait/balance instability.  He has fallen once at home since the initial eval but it is unclear if he fell into a piece of furniture versus onto the floor as his wife did not see it.  Pt demonstrates increased arm swing with gait today with frequent cuing but not as much as last visit.  Pt able to perform step tap (alternating hip flex touching foot to step in //bars) without holding with UE.  Pt has difficulty following 2 step directions 50% of time.  Pt did state today he realized he could perform an exercise without holding on that initially he could not do.  Pt's wife states that pt does his printed exercises every day at home and felt he could do a few more.  Pt demonstrates mild increases in functional mobility (including increase in Sun Balance score) and would benefit from continuing therapy one more month.  -     Rehab Potential Good  -     Patient/caregiver participated in establishment of treatment plan and goals Yes  -     Patient would benefit from skilled therapy intervention Yes  -     PT Plan    PT Frequency 2x/week  -DEAN     Predicted Duration of Therapy Intervention (days/wks) 4  weeks  -JA     Planned CPT's? PT RE-EVAL: 27287;PT THER PROC EA 15 MIN: 58245;PT NEUROMUSC RE-EDUCATION EA 15 MIN: 71420;PT GAIT TRAINING EA 15 MIN: 38641;PT HOT OR COLD PACK TREAT MCARE  -DEAN     PT Plan Comments focus on reinforcing gait with arm swing, heel strike (cuing large steps helps with heel strike), plan to add floor transfers in 1-2 weeks  -DEAN       User Key  (r) = Recorded By, (t) = Taken By, (c) = Cosigned By    Initials Name Provider Type    DEAN Matos, PT Physical Therapist                  Exercises       06/22/17 1600          Subjective Comments    Subjective Comments I couldn't do that when I first came.  -DEAN      Exercise 1    Exercise Name 1 Began w/ Nustep, See flowsheet in chart for ex details  -DEAN        User Key  (r) = Recorded By, (t) = Taken By, (c) = Cosigned By    Initials Name Provider Type    DEAN Matos, PT Physical Therapist                              Outcome Measures       06/22/17 1600          5 Times Sit to Stand    5 Times Sit to Stand (seconds) 30 seconds  -JA      Sun Balance Scale    Sitting to Standing 4  -JA      Standing Unsupported 4  -JA      Sitting with Back Unsupported but Feet Supported on Floor or on Stool 4  -JA      Standing to Sitting 4  -JA      Transfers 3  -JA      Standing Unsupported with Eyes Closed 3  -JA      Standing Unsupported with Feet Together 3  -JA      Reaching Forward with Outstretched Arm While Standing 2  -JA       Object From the Floor From a Standing Position 3  -JA      Turning to Look Behind Over Left and Right Shoulders While Standing 2  -JA      Turn 360 Degrees 2  -JA      Place Alternate Foot on Step or Stool While Standing Unsupported 2  -JA      Standing Unsupported with One Foot in Front 0  -JA      Standing on One Leg 0  -JA      Sun Total Score 36  -JA      Functional Assessment    Outcome Measure Options Sun Balance;Lower Extremity Functional Scale (LEFS);Timed Up and Go (TUG);2 Minute Walk Test;5x  Sit to Stand  -DEAN        User Key  (r) = Recorded By, (t) = Taken By, (c) = Cosigned By    Initials Name Provider Type    DEAN Matos PT Physical Therapist            Time Calculation:   Start Time: 1430  Stop Time: 1515  Time Calculation (min): 45 min     Therapy Charges for Today     Code Description Service Date Service Provider Modifiers Qty    01718453652 HC PT MOBILITY CURRENT 6/22/2017 Addie Matos, PT GP, CK 1    04517833817 HC PT MOBILITY PROJECTED 6/22/2017 Addie Matos, PT GP, CJ 1    15792900386 HC PT THER PROC EA 15 MIN 6/22/2017 Addie Matos, PT GP 1    07684579480 HC PT NEUROMUSC RE EDUCATION EA 15 MIN 6/22/2017 Addie Matos, PT GP 2          PT G-Codes  PT Professional Judgement Used?: Yes  Outcome Measure Options: Sun Balance, Lower Extremity Functional Scale (LEFS), Timed Up and Go (TUG), 2 Minute Walk Test, 5x Sit to Stand  Score: Sun 36/56 FTSS 30 sec  Functional Limitation: Mobility: Walking and moving around  Mobility: Walking and Moving Around Current Status (): At least 40 percent but less than 60 percent impaired, limited or restricted  Mobility: Walking and Moving Around Goal Status (): At least 20 percent but less than 40 percent impaired, limited or restricted         Addie Matos, PT  6/22/2017

## 2017-06-27 ENCOUNTER — HOSPITAL ENCOUNTER (OUTPATIENT)
Dept: PHYSICAL THERAPY | Facility: HOSPITAL | Age: 77
Setting detail: THERAPIES SERIES
Discharge: HOME | End: 2017-06-27

## 2017-06-27 DIAGNOSIS — R26.81 GAIT INSTABILITY: ICD-10-CM

## 2017-06-27 DIAGNOSIS — Z91.81 RISK FOR FALLS: ICD-10-CM

## 2017-06-27 DIAGNOSIS — R26.89 BALANCE PROBLEM: Primary | ICD-10-CM

## 2017-06-27 PROCEDURE — 97112 NEUROMUSCULAR REEDUCATION: CPT

## 2017-06-27 PROCEDURE — 97116 GAIT TRAINING THERAPY: CPT

## 2017-06-27 PROCEDURE — 97110 THERAPEUTIC EXERCISES: CPT

## 2017-06-27 NOTE — THERAPY TREATMENT NOTE
Outpatient Physical Therapy Ortho Treatment Note  [facility]     Patient Name: Aakash Martinez  : 1940  MRN: [id number]  Today's Date: 2017      Visit Date: 2017    Visit Dx:    ICD-10-CM ICD-9-CM   1. Balance problem R26.89 781.99   2. Risk for falls Z91.81 V15.88   3. Gait instability R26.81 781.2       Patient Active Problem List   Diagnosis   • Abnormal computerized tomography of brain   • Cognitive complaints   • Mixed anxiety depressive disorder   • Hypertension   • Obstructive sleep apnea syndrome   • Parkinsonism   • Depression   • Fatigue   • Memory impairment   • Peripheral neuropathy   • Sleep disorder   • Hypercholesterolemia   • Gait instability   • Falls        Past Medical History:   Diagnosis Date   • Acid reflux    • Angioedema 2016   • Aortic sclerosis    • Arthritis    • Depression    • ED (erectile dysfunction) of non-organic origin    • Hypercholesterolemia    • Hypertension    • Renal colic, bilateral     both kidneys        Past Surgical History:   Procedure Laterality Date   • HERNIA REPAIR Left     inguinal sliding, left with mesh   • NECK SURGERY     • OTHER SURGICAL HISTORY      RENAL LITHOTRIPSY   • TONSILLECTOMY                               PT Assessment/Plan       17 1328       PT Assessment    Assessment Comments Continued balance training and LE strengthening program. Pt demonstrates difficulty standing from standard chair height and requires BUE. Worked on posture with ambulation and encouraging B arm swing and heel strike during gait. Pt with limited retention of verbal cues and requires repetition throughout. Pt demonstrated improvement with visual scanning during ambulation to inc awareness of obstacles.   -LS     PT Plan    PT Plan Comments Continue balance and BLE strengthening exercises as well as gait training. Consider floor transfers.   -LS       User Key  (r) = Recorded By, (t) = Taken By, (c) = Cosigned By    Initials Name Provider Type     SERGIO Zamarripa, PT Physical Therapist                    Exercises       06/27/17 1300          Subjective Comments    Subjective Comments I had a bad night last night and didn't sleep much. My R shoulder was sore.  -LS      Exercise 1    Exercise Name 1 Began w/ Nustep, See flowsheet in chart for ex details  -        User Key  (r) = Recorded By, (t) = Taken By, (c) = Cosigned By    Initials Name Provider Type    LS Meena Zamarripa, PT Physical Therapist                               PT OP Goals       06/27/17 1300       PT Short Term Goals    STG 1 Patient will be independent and compliant with initial HEP for posture, ROM/flexibility, and basic strengthening.   -LS     STG 1 Progress Met  -LS     STG 2 Patient/spouse will report >/= 25% increased ease with ADL's/transitional movements  -LS     STG 2 Progress Ongoing  -LS     STG 3 Patient will demonstrate improving core/LE extremity strength and balance by improvement in the 5 Times Sit to Stand Test from 33 seconds to </= 28 seconds   -LS     STG 3 Progress Progressing  -LS     STG 3 Progress Comments Requires BUE on knees to perform STS.  -LS     STG 4 Patient will improve REEVES balance score to >/= 38/56  -LS     STG 4 Progress Progressing  -LS     STG 5 Patient will be educated on and demonstrate ability to transition from floor to standing independently for increased ease getting up from floor/ground  -LS     STG 5 Progress Ongoing  -LS     Long Term Goals    LTG 1 Patient/spouse will be independent with established HEP for strength/stabilization and balance to facilitate self management of condition  -LS     LTG 1 Progress Ongoing  -LS     LTG 2 Patient will demonstrate improving core/LE extremity strength and balance by improvement in the 5 Times Sit to Stand Test from 33 seconds to </= 24 seconds   -LS     LTG 2 Progress Ongoing  -LS     LTG 3 Patient will show decreased falls risk and improved safety with functional movements by improvement in the  Times Get Up and Go Test from 26 seconds to </= 18 seconds   -LS     LTG 3 Progress Ongoing  -LS     LTG 4 Patient will report reduced functional limitations on LEFS with score >= 34/80.  -LS     LTG 4 Progress Ongoing  -LS     LTG 5 Patient will improve REEVES balance score to >/= 44/56  -LS     LTG 5 Progress Ongoing;Progressing  -LS     LTG 6 Patient will demonstrate improvement in gait mechanics to allow him to safely ambulate >/= 300' during 2 minute walk test  -LS     LTG 6 Progress Ongoing;Progressing  -LS       User Key  (r) = Recorded By, (t) = Taken By, (c) = Cosigned By    Initials Name Provider Type    SERGIO Zamarripa PT Physical Therapist                Therapy Education       06/27/17 1327          Therapy Education    Given Posture/body mechanics;Mobility training  -LS      Program Reinforced  -LS      How Provided Verbal;Demonstration  -LS      Provided to Patient  -LS      Level of Understanding Teach back education performed;Verbalized;Demonstrated  -LS        User Key  (r) = Recorded By, (t) = Taken By, (c) = Cosigned By    Initials Name Provider Type    SERGIO Zamarripa PT Physical Therapist                Time Calculation:   Start Time: 1045  Stop Time: 1130  Time Calculation (min): 45 min    Therapy Charges for Today     Code Description Service Date Service Provider Modifiers Qty    62151513367 HC GAIT TRAINING EA 15 MIN 6/27/2017 Meena Zamarripa, PT GP 1    [id number] HC PT NEUROMUSC RE EDUCATION EA 15 MIN 6/27/2017 Meena Zamarripa, PT GP 1    67613120241 HC PT THER PROC EA 15 MIN 6/27/2017 Meena Zamarripa PT GP 1                    Meena Zamarripa PT  6/27/2017

## 2017-06-29 ENCOUNTER — HOSPITAL ENCOUNTER (OUTPATIENT)
Dept: PHYSICAL THERAPY | Facility: HOSPITAL | Age: 77
Setting detail: THERAPIES SERIES
Discharge: HOME | End: 2017-06-29

## 2017-06-29 DIAGNOSIS — R26.81 GAIT INSTABILITY: ICD-10-CM

## 2017-06-29 DIAGNOSIS — R26.89 BALANCE PROBLEM: Primary | ICD-10-CM

## 2017-06-29 DIAGNOSIS — Z91.81 RISK FOR FALLS: ICD-10-CM

## 2017-06-29 PROCEDURE — 97110 THERAPEUTIC EXERCISES: CPT

## 2017-06-29 PROCEDURE — 97116 GAIT TRAINING THERAPY: CPT

## 2017-06-29 PROCEDURE — 97112 NEUROMUSCULAR REEDUCATION: CPT

## 2017-06-29 NOTE — THERAPY TREATMENT NOTE
Outpatient Physical Therapy Ortho Treatment Note  [facility]     Patient Name: Aakash Martinez  : 1940  MRN: [id number]  Today's Date: 2017      Visit Date: 2017    Visit Dx:    ICD-10-CM ICD-9-CM   1. Balance problem R26.89 781.99   2. Risk for falls Z91.81 V15.88   3. Gait instability R26.81 781.2       Patient Active Problem List   Diagnosis   • Abnormal computerized tomography of brain   • Cognitive complaints   • Mixed anxiety depressive disorder   • Hypertension   • Obstructive sleep apnea syndrome   • Parkinsonism   • Depression   • Fatigue   • Memory impairment   • Peripheral neuropathy   • Sleep disorder   • Hypercholesterolemia   • Gait instability   • Falls        Past Medical History:   Diagnosis Date   • Acid reflux    • Angioedema 2016   • Aortic sclerosis    • Arthritis    • Depression    • ED (erectile dysfunction) of non-organic origin    • Hypercholesterolemia    • Hypertension    • Renal colic, bilateral     both kidneys        Past Surgical History:   Procedure Laterality Date   • HERNIA REPAIR Left     inguinal sliding, left with mesh   • NECK SURGERY     • OTHER SURGICAL HISTORY      RENAL LITHOTRIPSY   • TONSILLECTOMY                               PT Assessment/Plan       17 1340       PT Assessment    Assessment Comments Pt continues to have difficulty performing STS from normal chair height witout UE assist. Added reciprocal UE/LE movement with focus on exaggerated step length in combination with UE movement. Pt required repeated instruction and demonstration but improved ability with repetitions. Difficulty demonstrating heel strike B today with new footwear worn.   -LS     PT Plan    PT Plan Comments Continue balance and BLE strengthening program as well as gait training. incorporate exaggerated steps in all planes to improve step length   -SERGIO       User Key  (r) = Recorded By, (t) = Taken By, (c) = Cosigned By    Initials Name Provider Type    SERGIO Robledo  Dallin, PT Physical Therapist                    Exercises       06/29/17 1300          Subjective Comments    Subjective Comments I am doing well today. I have trouble picking up these shoes, they are heavy.  -LS      Exercise 1    Exercise Name 1 See ex flowsheet.  -LS        User Key  (r) = Recorded By, (t) = Taken By, (c) = Cosigned By    Initials Name Provider Type    SERGIO Zamarripa, PT Physical Therapist                               PT OP Goals       06/29/17 1300       PT Short Term Goals    STG 1 Patient will be independent and compliant with initial HEP for posture, ROM/flexibility, and basic strengthening.   -LS     STG 1 Progress Met  -LS     STG 2 Patient/spouse will report >/= 25% increased ease with ADL's/transitional movements  -LS     STG 2 Progress Ongoing  -LS     STG 3 Patient will demonstrate improving core/LE extremity strength and balance by improvement in the 5 Times Sit to Stand Test from 33 seconds to </= 28 seconds   -LS     STG 3 Progress Progressing  -LS     STG 3 Progress Comments Difficulty performing STS without UE from normal chair height.  -LS     STG 4 Patient will improve REEVES balance score to >/= 38/56  -LS     STG 4 Progress Progressing  -LS     STG 5 Patient will be educated on and demonstrate ability to transition from floor to standing independently for increased ease getting up from floor/ground  -LS     STG 5 Progress Ongoing  -LS     Long Term Goals    LTG 1 Patient/spouse will be independent with established HEP for strength/stabilization and balance to facilitate self management of condition  -LS     LTG 1 Progress Ongoing  -LS     LTG 2 Patient will demonstrate improving core/LE extremity strength and balance by improvement in the 5 Times Sit to Stand Test from 33 seconds to </= 24 seconds   -LS     LTG 2 Progress Ongoing  -LS     LTG 3 Patient will show decreased falls risk and improved safety with functional movements by improvement in the Times Get Up and Go Test  from 26 seconds to </= 18 seconds   -LS     LTG 3 Progress Ongoing  -LS     LTG 4 Patient will report reduced functional limitations on LEFS with score >= 34/80.  -LS     LTG 4 Progress Ongoing  -LS     LTG 5 Patient will improve REEVES balance score to >/= 44/56  -LS     LTG 5 Progress Ongoing;Progressing  -LS     LTG 6 Patient will demonstrate improvement in gait mechanics to allow him to safely ambulate >/= 300' during 2 minute walk test  -LS     LTG 6 Progress Ongoing;Progressing  -LS       User Key  (r) = Recorded By, (t) = Taken By, (c) = Cosigned By    Initials Name Provider Type    SERGIO Zamarripa PT Physical Therapist                Therapy Education       06/29/17 1340          Therapy Education    Given Posture/body mechanics  -LS      Program Reinforced  -LS      How Provided Verbal;Demonstration  -LS      Provided to Patient  -LS      Level of Understanding Verbalized;Demonstrated;Teach back education performed  -LS        User Key  (r) = Recorded By, (t) = Taken By, (c) = Cosigned By    Initials Name Provider Type    SERGIO Zamarripa PT Physical Therapist                Time Calculation:   Start Time: 1115  Stop Time: 1200  Time Calculation (min): 45 min    Therapy Charges for Today     Code Description Service Date Service Provider Modifiers Qty    18233124332 HC PT THER PROC EA 15 MIN 6/29/2017 Meena Zamarripa, PT GP 1    47737106438 HC PT NEUROMUSC RE EDUCATION EA 15 MIN 6/29/2017 Meena Zamarripa, PT GP 1    52823633468 HC GAIT TRAINING EA 15 MIN 6/29/2017 Meena Zamarripa PT GP 1                    Meena Zamarripa PT  6/29/2017

## 2017-07-03 ENCOUNTER — HOSPITAL ENCOUNTER (OUTPATIENT)
Dept: PHYSICAL THERAPY | Facility: HOSPITAL | Age: 77
Setting detail: THERAPIES SERIES
Discharge: HOME | End: 2017-07-03

## 2017-07-03 DIAGNOSIS — Z91.81 RISK FOR FALLS: ICD-10-CM

## 2017-07-03 DIAGNOSIS — R26.81 GAIT INSTABILITY: ICD-10-CM

## 2017-07-03 DIAGNOSIS — R26.89 BALANCE PROBLEM: Primary | ICD-10-CM
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
## 2017-07-03 PROCEDURE — 97110 THERAPEUTIC EXERCISES: CPT

## 2017-07-03 PROCEDURE — 97112 NEUROMUSCULAR REEDUCATION: CPT

## 2017-07-03 NOTE — THERAPY TREATMENT NOTE
Outpatient Physical Therapy Ortho Treatment Note  [facility]     Patient Name: Aakash Martinez  : 1940  MRN: [id number]  Today's Date: 7/3/2017      Visit Date: 2017    Visit Dx:    ICD-10-CM ICD-9-CM   1. Balance problem R26.89 781.99   2. Risk for falls Z91.81 V15.88   3. Gait instability R26.81 781.2   4. Falls, subsequent encounter W19.XXXD V58.89     E888.9       Patient Active Problem List   Diagnosis   • Abnormal computerized tomography of brain   • Cognitive complaints   • Mixed anxiety depressive disorder   • Hypertension   • Obstructive sleep apnea syndrome   • Parkinsonism   • Depression   • Fatigue   • Memory impairment   • Peripheral neuropathy   • Sleep disorder   • Hypercholesterolemia   • Gait instability   • Falls        Past Medical History:   Diagnosis Date   • Acid reflux    • Angioedema 2016   • Aortic sclerosis    • Arthritis    • Depression    • ED (erectile dysfunction) of non-organic origin    • Hypercholesterolemia    • Hypertension    • Renal colic, bilateral     both kidneys        Past Surgical History:   Procedure Laterality Date   • HERNIA REPAIR Left     inguinal sliding, left with mesh   • NECK SURGERY     • OTHER SURGICAL HISTORY      RENAL LITHOTRIPSY   • TONSILLECTOMY                               PT Assessment/Plan       17 1345       PT Assessment    Assessment Comments Pt able to perform STS without using UE's but had a moment of unsteadiness with one repetition of 5.  Continuing to work on reciprocal arm swing with gait and pt was able to perform 10% of the time.  Pt has difficulty with 2-step commands but with repetition was able to perform exercises as asked.  Had pt push rolling cart in gym and gait was more symmetrical.  Pt has difficulty with standing erect--very slouch shouldered  -JA     PT Plan    PT Plan Comments see 2 more visits, try cane again and alsoRWX; cont with exaggerated steps and arm swing  -JA       User Key  (r) = Recorded  By, (t) = Taken By, (c) = Cosigned By    Initials Name Provider Type    DEAN Matos PT Physical Therapist                    Exercises       07/03/17 1600          Subjective Comments    Subjective Comments How much longer do I have to do this?  -DEAN      Exercise 1    Exercise Name 1 See ex flowsheet.  -DEAN        User Key  (r) = Recorded By, (t) = Taken By, (c) = Cosigned By    Initials Name Provider Type    DEAN Matos PT Physical Therapist                                   Therapy Education       07/03/17 1345          Therapy Education    Given Mobility training;Fall prevention and home safety   Discussed with pt and wife that pt is able to walk without  assistive device now but in the future will likley need to use a RWx, or possibly a cane /hiking stick at the least.  Discussed continuing ex's after discharge.  -DEAN      Program Progressed  -DEAN      How Provided Verbal;Demonstration  -DEAN      Provided to Patient;Caregiver  -DEAN      Level of Understanding Teach back education performed  -DEAN        User Key  (r) = Recorded By, (t) = Taken By, (c) = Cosigned By    Initials Name Provider Type    DEAN Matos PT Physical Therapist                Time Calculation:   Start Time: 1345  Stop Time: 1430  Time Calculation (min): 45 min    Therapy Charges for Today     Code Description Service Date Service Provider Modifiers Qty    44040367885 HC PT THER PROC EA 15 MIN 7/3/2017 Addie Matos, PT GP 2    22971148965 HC PT NEUROMUSC RE EDUCATION EA 15 MIN 7/3/2017 Addie Matos PT GP 1                    Addie Matos PT  7/3/2017

## 2017-07-06 ENCOUNTER — HOSPITAL ENCOUNTER (OUTPATIENT)
Dept: PHYSICAL THERAPY | Facility: HOSPITAL | Age: 77
Setting detail: THERAPIES SERIES
Discharge: HOME | End: 2017-07-06

## 2017-07-06 DIAGNOSIS — R26.81 GAIT INSTABILITY: ICD-10-CM

## 2017-07-06 DIAGNOSIS — R26.89 BALANCE PROBLEM: Primary | ICD-10-CM

## 2017-07-06 DIAGNOSIS — Z91.81 RISK FOR FALLS: ICD-10-CM
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
## 2017-07-06 PROCEDURE — 97116 GAIT TRAINING THERAPY: CPT

## 2017-07-06 PROCEDURE — 97112 NEUROMUSCULAR REEDUCATION: CPT

## 2017-07-06 PROCEDURE — 97110 THERAPEUTIC EXERCISES: CPT

## 2017-07-06 NOTE — THERAPY TREATMENT NOTE
"t    Outpatient Physical Therapy Ortho Treatment Note  [facility]     Patient Name: Aakash Martinez  : 1940  MRN: [id number]  Today's Date: 2017      Visit Date: 2017    Visit Dx:    ICD-10-CM ICD-9-CM   1. Balance problem R26.89 781.99   2. Risk for falls Z91.81 V15.88   3. Gait instability R26.81 781.2   4. Falls, subsequent encounter W19.XXXD V58.89     E888.9       Patient Active Problem List   Diagnosis   • Abnormal computerized tomography of brain   • Cognitive complaints   • Mixed anxiety depressive disorder   • Hypertension   • Obstructive sleep apnea syndrome   • Parkinsonism   • Depression   • Fatigue   • Memory impairment   • Peripheral neuropathy   • Sleep disorder   • Hypercholesterolemia   • Gait instability   • Falls        Past Medical History:   Diagnosis Date   • Acid reflux    • Angioedema 2016   • Aortic sclerosis    • Arthritis    • Depression    • ED (erectile dysfunction) of non-organic origin    • Hypercholesterolemia    • Hypertension    • Renal colic, bilateral     both kidneys        Past Surgical History:   Procedure Laterality Date   • HERNIA REPAIR Left     inguinal sliding, left with mesh   • NECK SURGERY     • OTHER SURGICAL HISTORY      RENAL LITHOTRIPSY   • TONSILLECTOMY                               PT Assessment/Plan       17 1526       PT Assessment    Assessment Comments Pt demonstrates improvement in Sun score and FTSS today.  During ambulation around clinic observed pt with small reciprocal arm swing occurring naturally without cuing and pt was able to quicken pace smoothly when directed although did not maintain. Pt's posture varies and he \"sinks\" down occasionally standing/walking with knees flexed.  Pt has one more visit --will try floor transfer.  -DEAN     PT Plan    PT Plan Comments Try floor transfer; gait with varied speeds; finalize HEP.  -DEAN       User Key  (r) = Recorded By, (t) = Taken By, (c) = Cosigned By    Initials Name Provider " Type    DEAN Matos, PT Physical Therapist                    Exercises       07/06/17 1300          Subjective Comments    Subjective Comments I do what my wife says.  -DEAN      Exercise 1    Exercise Name 1 See ex flowsheet.  -DEAN        User Key  (r) = Recorded By, (t) = Taken By, (c) = Cosigned By    Initials Name Provider Type    DEAN Matos, PT Physical Therapist                               PT OP Goals       07/06/17 1300       PT Short Term Goals    STG 1 Patient will be independent and compliant with initial HEP for posture, ROM/flexibility, and basic strengthening.   -DEAN     STG 1 Progress Met  -     STG 2 Patient/spouse will report >/= 25% increased ease with ADL's/transitional movements  -     STG 2 Progress Met  -     STG 2 Progress Comments spouse noted pt has been moving around better  -     STG 3 Patient will demonstrate improving core/LE extremity strength and balance by improvement in the 5 Times Sit to Stand Test from 33 seconds to </= 28 seconds   -     STG 3 Progress Met  -     STG 3 Progress Comments 20 seconds  -     STG 4 Patient will improve REEVES balance score to >/= 38/56  -     STG 4 Progress Progressing  -     STG 5 Patient will be educated on and demonstrate ability to transition from floor to standing independently for increased ease getting up from floor/ground  -     STG 5 Progress Ongoing  -     Long Term Goals    LTG 1 Patient/spouse will be independent with established HEP for strength/stabilization and balance to facilitate self management of condition  -     LTG 1 Progress Progressing  -     LTG 1 Progress Comments cues required due to memory loss  -     LTG 2 Patient will demonstrate improving core/LE extremity strength and balance by improvement in the 5 Times Sit to Stand Test from 33 seconds to </= 24 seconds   -     LTG 2 Progress Met  -     LTG 2 Progress Comments 20 sec  -     LTG 3 Patient will show decreased falls risk  and improved safety with functional movements by improvement in the Times Get Up and Go Test from 26 seconds to </= 18 seconds   -DEAN     LTG 3 Progress Ongoing  -JA     LTG 3 Progress Comments will retest on Tuesday  -     LTG 4 Patient will report reduced functional limitations on LEFS with score >= 34/80.  -JA     LTG 4 Progress Ongoing  -     LTG 4 Progress Comments will assess next visit  -     LTG 5 Patient will improve REEVES balance score to >/= 44/56  -JA     LTG 5 Progress Progressing  -JA     LTG 5 Progress Comments 40/56  -JA     LTG 6 Patient will demonstrate improvement in gait mechanics to allow him to safely ambulate >/= 300' during 2 minute walk test  -     LTG 6 Progress Progressing  -DEAN     LTG 6 Progress Comments 260'  -DEAN       User Key  (r) = Recorded By, (t) = Taken By, (c) = Cosigned By    Initials Name Provider Type    DEAN Matos, PT Physical Therapist                Therapy Education       07/06/17 1345          Therapy Education    Given Mobility training;HEP  -DEAN      Program Reinforced  -DEAN      How Provided Verbal;Demonstration  -DEAN      Provided to Patient;Caregiver  -DEAN      Level of Understanding Teach back education performed  -DEAN        User Key  (r) = Recorded By, (t) = Taken By, (c) = Cosigned By    Initials Name Provider Type    DEAN Matos, PT Physical Therapist                Outcome Measures       07/06/17 1400          2 Minute Walk Test    Gait, Assistive Device --   none  -JA      Distance Ambulated in 2 Minutes 260  -JA      5 Times Sit to Stand    5 Times Sit to Stand (seconds) 10 seconds  -JA      5 Times Sit to Stand Comments used one or both hands to push up from chair  -JA      Reeves Balance Scale    Sitting to Standing 4  -JA      Standing Unsupported 4  -JA      Sitting with Back Unsupported but Feet Supported on Floor or on Stool 4  -JA      Standing to Sitting 4  -JA      Transfers 3  -JA      Standing Unsupported with Eyes Closed 3  -JA       Standing Unsupported with Feet Together 4  -JA      Reaching Forward with Outstretched Arm While Standing 2  -JA       Object From the Floor From a Standing Position 3  -JA      Turning to Look Behind Over Left and Right Shoulders While Standing 2  -JA      Turn 360 Degrees 2  -JA      Place Alternate Foot on Step or Stool While Standing Unsupported 3  -JA      Standing Unsupported with One Foot in Front 1  -JA      Standing on One Leg 1  -JA      Sun Total Score 40  -JA      Functional Assessment    Outcome Measure Options Sun Balance;Lower Extremity Functional Scale (LEFS);Timed Up and Go (TUG);2 Minute Walk Test;5x Sit to Stand  -JA        User Key  (r) = Recorded By, (t) = Taken By, (c) = Cosigned By    Initials Name Provider Type    DEAN Matos PT Physical Therapist            Time Calculation:   Start Time: 1345  Stop Time: 1430  Time Calculation (min): 45 min    Therapy Charges for Today     Code Description Service Date Service Provider Modifiers Qty    15226393652 HC PT THER PROC EA 15 MIN 7/6/2017 Addie Matos, PT GP 1    87616059478 HC PT NEUROMUSC RE EDUCATION EA 15 MIN 7/6/2017 Addie Matos, PT GP 1    83352370493 HC GAIT TRAINING EA 15 MIN 7/6/2017 Addie Matos, PT GP 1          PT G-Codes  Outcome Measure Options: Sun Balance, Lower Extremity Functional Scale (LEFS), Timed Up and Go (TUG), 2 Minute Walk Test, 5x Sit to Stand         Addie Matos PT  7/6/2017

## 2017-07-11 ENCOUNTER — APPOINTMENT (OUTPATIENT)
Dept: PHYSICAL THERAPY | Facility: HOSPITAL | Age: 77
End: 2017-07-11

## 2017-07-20 ENCOUNTER — OFFICE VISIT (OUTPATIENT)
Dept: FAMILY MEDICINE CLINIC | Facility: CLINIC | Age: 77
End: 2017-07-20

## 2017-07-20 VITALS
HEIGHT: 72 IN | TEMPERATURE: 98.1 F | OXYGEN SATURATION: 99 % | HEART RATE: 54 BPM | WEIGHT: 173 LBS | DIASTOLIC BLOOD PRESSURE: 82 MMHG | BODY MASS INDEX: 23.43 KG/M2 | SYSTOLIC BLOOD PRESSURE: 136 MMHG

## 2017-07-20 DIAGNOSIS — R26.81 GAIT INSTABILITY: ICD-10-CM

## 2017-07-20 DIAGNOSIS — I10 ESSENTIAL HYPERTENSION: Primary | ICD-10-CM

## 2017-07-20 DIAGNOSIS — E78.00 HYPERCHOLESTEROLEMIA: ICD-10-CM

## 2017-07-20 DIAGNOSIS — R41.3 MEMORY IMPAIRMENT: ICD-10-CM

## 2017-07-20 DIAGNOSIS — F41.8 MIXED ANXIETY DEPRESSIVE DISORDER: ICD-10-CM

## 2017-07-20 PROCEDURE — 99213 OFFICE O/P EST LOW 20 MIN: CPT | Performed by: NURSE PRACTITIONER

## 2017-07-20 RX ORDER — AMLODIPINE BESYLATE 5 MG/1
TABLET ORAL
Qty: 135 TABLET | Refills: 1 | Status: CANCELLED | OUTPATIENT
Start: 2017-07-20

## 2017-07-20 NOTE — PROGRESS NOTES
Subjective   Aakash Martinez is a 77 y.o. male.     HPI Comments: Follow-up  Anxiety depression    Memory problems mild dementia symptoms  Recently saw neurology  Trial of Namenda starting slowly and checking blood pressure    He's had several falls recently no acute injuries  He is supposed to be using a rolling walker he does not frequently    Fell on top his  a few weeks ago no injuries at that time    Wife tries to get him to listen to her  Use the walker or cane    No agitation  No increasing confusion    Blood pressures controlled    Compliant with cholesterol medication         The following portions of the patient's history were reviewed and updated as appropriate: allergies, current medications, past medical history, past social history, past surgical history and problem list.    Review of Systems   Constitutional: Negative.  Negative for appetite change, chills, fatigue, fever and unexpected weight change.   HENT: Negative for congestion, ear pain and sore throat.    Eyes: Negative.    Respiratory: Negative for cough, chest tightness, shortness of breath and wheezing.    Cardiovascular: Negative for chest pain, palpitations and leg swelling.   Gastrointestinal: Negative for abdominal pain and constipation.   Genitourinary: Negative for difficulty urinating, dysuria and urgency.   Musculoskeletal: Positive for gait problem. Negative for arthralgias and myalgias.   Skin: Negative for rash and wound.   Neurological: Negative for dizziness, speech difficulty, weakness, numbness and headaches.        Mild memory problems   Psychiatric/Behavioral: Negative for sleep disturbance. The patient is nervous/anxious.        Objective   Physical Exam   Constitutional: He is oriented to person, place, and time. He appears well-developed and well-nourished.   HENT:   Head: Normocephalic.   Nose: Nose normal.   Mouth/Throat: Oropharynx is clear and moist.   Tm clear debora no mass canal patent without d/c   Eyes:  Conjunctivae are normal. Pupils are equal, round, and reactive to light. No scleral icterus.   Neck: Neck supple. No JVD present. No thyromegaly present.   Cardiovascular: Normal rate, regular rhythm and normal heart sounds.  Exam reveals no gallop and no friction rub.    No murmur heard.  Pulmonary/Chest: Effort normal and breath sounds normal. No stridor. No respiratory distress. He has no wheezes. He has no rales.   Abdominal: Soft. Bowel sounds are normal. He exhibits no distension. There is no tenderness.   No hepatosplenomegaly, no ascites,   Musculoskeletal: He exhibits no edema or tenderness.   Gait a little unsteady, needs assistance coming off the table posture leaning forward somewhat mild kyphosis no weakness   Lymphadenopathy:     He has no cervical adenopathy.   Neurological: He is alert and oriented to person, place, and time. He has normal reflexes.   Skin: Skin is warm and dry. No rash noted. No erythema.   Psychiatric: He has a normal mood and affect. His behavior is normal. Judgment and thought content normal.   Vitals reviewed.      Assessment/Plan   Aakash was seen today for follow-up.    Diagnoses and all orders for this visit:    Essential hypertension    Hypercholesterolemia    Other orders  -     Cancel: amLODIPine (NORVASC) 5 MG tablet; Take 1 1/2 tab daily                  Use walker at all times if not use the cane  No climbing  Avoid grass cutting  Avoid high-risk activities  He is high risk falls falls precautions  Continue present medications he feels better on Lexapro 20, does not want to decrease      Reviewed medications  Discussed Lexapro specifically continue 20 mg  Continue Pravachol Norvasc Plavix

## 2017-09-16 ENCOUNTER — HOSPITAL ENCOUNTER (EMERGENCY)
Facility: HOSPITAL | Age: 77
Discharge: HOME | End: 2017-09-16
Attending: EMERGENCY MEDICINE | Admitting: EMERGENCY MEDICINE

## 2017-09-16 ENCOUNTER — APPOINTMENT (OUTPATIENT)
Dept: CT IMAGING | Facility: HOSPITAL | Age: 77
End: 2017-09-16

## 2017-09-16 VITALS
RESPIRATION RATE: 14 BRPM | OXYGEN SATURATION: 94 % | BODY MASS INDEX: 23.62 KG/M2 | DIASTOLIC BLOOD PRESSURE: 67 MMHG | WEIGHT: 165 LBS | TEMPERATURE: 100.3 F | SYSTOLIC BLOOD PRESSURE: 152 MMHG | HEIGHT: 70 IN | HEART RATE: 64 BPM

## 2017-09-16 DIAGNOSIS — N13.2 URETERAL STONE WITH HYDRONEPHROSIS: Primary | ICD-10-CM

## 2017-09-16 LAB
ALBUMIN SERPL-MCNC: 5 G/DL (ref 3.5–5.2)
ALBUMIN/GLOB SERPL: 2.1 G/DL
ALP SERPL-CCNC: 64 U/L (ref 39–117)
ALT SERPL W P-5'-P-CCNC: 18 U/L (ref 1–41)
ANION GAP SERPL CALCULATED.3IONS-SCNC: 15.8 MMOL/L
AST SERPL-CCNC: 20 U/L (ref 1–40)
BACTERIA UR QL AUTO: ABNORMAL /HPF
BASOPHILS # BLD AUTO: 0.02 10*3/MM3 (ref 0–0.2)
BASOPHILS NFR BLD AUTO: 0.2 % (ref 0–1.5)
BILIRUB SERPL-MCNC: 0.4 MG/DL (ref 0.1–1.2)
BILIRUB UR QL STRIP: NEGATIVE
BUN BLD-MCNC: 30 MG/DL (ref 8–23)
BUN/CREAT SERPL: 21.7 (ref 7–25)
CALCIUM SPEC-SCNC: 10.1 MG/DL (ref 8.6–10.5)
CHLORIDE SERPL-SCNC: 105 MMOL/L (ref 98–107)
CLARITY UR: ABNORMAL
CO2 SERPL-SCNC: 26.2 MMOL/L (ref 22–29)
COLOR UR: ABNORMAL
CREAT BLD-MCNC: 1.38 MG/DL (ref 0.76–1.27)
DEPRECATED RDW RBC AUTO: 46.7 FL (ref 37–54)
EOSINOPHIL # BLD AUTO: 0 10*3/MM3 (ref 0–0.7)
EOSINOPHIL NFR BLD AUTO: 0 % (ref 0.3–6.2)
ERYTHROCYTE [DISTWIDTH] IN BLOOD BY AUTOMATED COUNT: 13.3 % (ref 11.5–14.5)
GFR SERPL CREATININE-BSD FRML MDRD: 50 ML/MIN/1.73
GLOBULIN UR ELPH-MCNC: 2.4 GM/DL
GLUCOSE BLD-MCNC: 173 MG/DL (ref 65–99)
GLUCOSE UR STRIP-MCNC: ABNORMAL MG/DL
HCT VFR BLD AUTO: 41.4 % (ref 40.4–52.2)
HGB BLD-MCNC: 13.7 G/DL (ref 13.7–17.6)
HGB UR QL STRIP.AUTO: ABNORMAL
HYALINE CASTS UR QL AUTO: ABNORMAL /LPF
IMM GRANULOCYTES # BLD: 0 10*3/MM3 (ref 0–0.03)
IMM GRANULOCYTES NFR BLD: 0 % (ref 0–0.5)
INR PPP: 1.06 (ref 0.9–1.1)
KETONES UR QL STRIP: ABNORMAL
LEUKOCYTE ESTERASE UR QL STRIP.AUTO: ABNORMAL
LIPASE SERPL-CCNC: 41 U/L (ref 13–60)
LYMPHOCYTES # BLD AUTO: 0.83 10*3/MM3 (ref 0.9–4.8)
LYMPHOCYTES NFR BLD AUTO: 8.1 % (ref 19.6–45.3)
MCH RBC QN AUTO: 31.6 PG (ref 27–32.7)
MCHC RBC AUTO-ENTMCNC: 33.1 G/DL (ref 32.6–36.4)
MCV RBC AUTO: 95.6 FL (ref 79.8–96.2)
MONOCYTES # BLD AUTO: 0.16 10*3/MM3 (ref 0.2–1.2)
MONOCYTES NFR BLD AUTO: 1.6 % (ref 5–12)
NEUTROPHILS # BLD AUTO: 9.21 10*3/MM3 (ref 1.9–8.1)
NEUTROPHILS NFR BLD AUTO: 90.1 % (ref 42.7–76)
NITRITE UR QL STRIP: NEGATIVE
PH UR STRIP.AUTO: 8 [PH] (ref 5–8)
PLATELET # BLD AUTO: 207 10*3/MM3 (ref 140–500)
PMV BLD AUTO: 12.2 FL (ref 6–12)
POTASSIUM BLD-SCNC: 4 MMOL/L (ref 3.5–5.2)
PROT SERPL-MCNC: 7.4 G/DL (ref 6–8.5)
PROT UR QL STRIP: ABNORMAL
PROTHROMBIN TIME: 13.4 SECONDS (ref 11.7–14.2)
RBC # BLD AUTO: 4.33 10*6/MM3 (ref 4.6–6)
RBC # UR: ABNORMAL /HPF
REF LAB TEST METHOD: ABNORMAL
SODIUM BLD-SCNC: 147 MMOL/L (ref 136–145)
SP GR UR STRIP: 1.02 (ref 1–1.03)
SQUAMOUS #/AREA URNS HPF: ABNORMAL /HPF
UROBILINOGEN UR QL STRIP: ABNORMAL
WBC NRBC COR # BLD: 10.22 10*3/MM3 (ref 4.5–10.7)
WBC UR QL AUTO: ABNORMAL /HPF

## 2017-09-16 PROCEDURE — 96365 THER/PROPH/DIAG IV INF INIT: CPT

## 2017-09-16 PROCEDURE — 93005 ELECTROCARDIOGRAM TRACING: CPT | Performed by: EMERGENCY MEDICINE

## 2017-09-16 PROCEDURE — 99285 EMERGENCY DEPT VISIT HI MDM: CPT

## 2017-09-16 PROCEDURE — 93010 ELECTROCARDIOGRAM REPORT: CPT | Performed by: INTERNAL MEDICINE

## 2017-09-16 PROCEDURE — 80053 COMPREHEN METABOLIC PANEL: CPT | Performed by: EMERGENCY MEDICINE

## 2017-09-16 PROCEDURE — 85610 PROTHROMBIN TIME: CPT | Performed by: EMERGENCY MEDICINE

## 2017-09-16 PROCEDURE — 83690 ASSAY OF LIPASE: CPT | Performed by: EMERGENCY MEDICINE

## 2017-09-16 PROCEDURE — 96361 HYDRATE IV INFUSION ADD-ON: CPT

## 2017-09-16 PROCEDURE — 85025 COMPLETE CBC W/AUTO DIFF WBC: CPT | Performed by: EMERGENCY MEDICINE

## 2017-09-16 PROCEDURE — 87086 URINE CULTURE/COLONY COUNT: CPT | Performed by: EMERGENCY MEDICINE

## 2017-09-16 PROCEDURE — 81001 URINALYSIS AUTO W/SCOPE: CPT | Performed by: EMERGENCY MEDICINE

## 2017-09-16 PROCEDURE — 74176 CT ABD & PELVIS W/O CONTRAST: CPT

## 2017-09-16 PROCEDURE — 25010000002 CEFTRIAXONE PER 250 MG: Performed by: EMERGENCY MEDICINE

## 2017-09-16 RX ORDER — ONDANSETRON 4 MG/1
4 TABLET, ORALLY DISINTEGRATING ORAL EVERY 6 HOURS PRN
Qty: 10 TABLET | Refills: 0 | Status: SHIPPED | OUTPATIENT
Start: 2017-09-16 | End: 2017-10-19

## 2017-09-16 RX ORDER — HYDROCODONE BITARTRATE AND ACETAMINOPHEN 5; 325 MG/1; MG/1
1 TABLET ORAL EVERY 8 HOURS PRN
Qty: 15 TABLET | Refills: 0 | Status: SHIPPED | OUTPATIENT
Start: 2017-09-16 | End: 2017-10-19

## 2017-09-16 RX ORDER — TAMSULOSIN HYDROCHLORIDE 0.4 MG/1
1 CAPSULE ORAL NIGHTLY
Qty: 10 CAPSULE | Refills: 0 | Status: SHIPPED | OUTPATIENT
Start: 2017-09-16 | End: 2017-10-19

## 2017-09-16 RX ORDER — MEMANTINE HYDROCHLORIDE 28 MG/1
28 CAPSULE, EXTENDED RELEASE ORAL DAILY
COMMUNITY
End: 2018-04-23 | Stop reason: SDUPTHER

## 2017-09-16 RX ORDER — CEFTRIAXONE SODIUM 1 G/50ML
1 INJECTION, SOLUTION INTRAVENOUS ONCE
Status: COMPLETED | OUTPATIENT
Start: 2017-09-16 | End: 2017-09-16

## 2017-09-16 RX ORDER — ACETAMINOPHEN 500 MG
1000 TABLET ORAL ONCE
Status: COMPLETED | OUTPATIENT
Start: 2017-09-16 | End: 2017-09-16

## 2017-09-16 RX ORDER — CIPROFLOXACIN 250 MG/1
250 TABLET, FILM COATED ORAL 2 TIMES DAILY
Qty: 14 TABLET | Refills: 0 | Status: SHIPPED | OUTPATIENT
Start: 2017-09-16 | End: 2017-10-19

## 2017-09-16 RX ORDER — SODIUM CHLORIDE 0.9 % (FLUSH) 0.9 %
10 SYRINGE (ML) INJECTION AS NEEDED
Status: DISCONTINUED | OUTPATIENT
Start: 2017-09-16 | End: 2017-09-16 | Stop reason: HOSPADM

## 2017-09-16 RX ADMIN — CEFTRIAXONE SODIUM 1 G: 1 INJECTION, SOLUTION INTRAVENOUS at 12:48

## 2017-09-16 RX ADMIN — ACETAMINOPHEN 1000 MG: 500 TABLET ORAL at 11:56

## 2017-09-16 RX ADMIN — SODIUM CHLORIDE 500 ML: 9 INJECTION, SOLUTION INTRAVENOUS at 11:34

## 2017-09-16 NOTE — DISCHARGE INSTRUCTIONS
Continue home medications and take new prescriptions as directed.  Strain your urine and follow up with Dr. Choudhary.  Please return to the ED if you cannot control your pain, if you have vomiting or a temperature of 101 or higher.

## 2017-09-16 NOTE — ED PROVIDER NOTES
" EMERGENCY DEPARTMENT ENCOUNTER    CHIEF COMPLAINT  Chief Complaint: Vomiting  History given by: Pt, Pt's daughter  History limited by: Dementia  Room Number: 17/17  PMD: James Epley, APRN      HPI:  Pt is a 77 y.o. male with a hx of nephrolithiasis who presents complaining of vomiting onset earlier this morning. Per the pt's daughter, the pt also experienced hematuria described as \"bright red\" and suprapubic non-radiating abd pain but denies fever, rash, diarrhea, or any other symptoms at this time.  Currently, patient states that he is not hurting.     Duration:  PTA  Onset: sudden  Timing: constant  Location: n/a  Radiation: none  Quality: \"vomiting\"  Intensity/Severity: moderate  Progression: unchanged  Associated Symptoms: hematuria described as \"bright red\" and suprapubic non-radiating abd pain   Aggravating Factors: none  Alleviating Factors: none  Previous Episodes: Pt has a hx of ne  Treatment before arrival: Pt received no treatment PTA.    PAST MEDICAL HISTORY  Active Ambulatory Problems     Diagnosis Date Noted   • Abnormal computerized tomography of brain 01/27/2016   • Cognitive complaints 01/27/2016   • Mixed anxiety depressive disorder 01/27/2016   • Hypertension 01/27/2016   • Obstructive sleep apnea syndrome 01/27/2016   • Parkinsonism 01/27/2016   • Depression 01/28/2016   • Fatigue 01/28/2016   • Memory impairment 01/28/2016   • Peripheral neuropathy 01/28/2016   • Sleep disorder 01/28/2016   • Hypercholesterolemia 10/13/2016   • Gait instability 05/15/2017   • Falls 05/15/2017     Resolved Ambulatory Problems     Diagnosis Date Noted   • Angioedema 01/27/2016     Past Medical History:   Diagnosis Date   • Acid reflux    • Alzheimer disease    • Angioedema 1/27/2016   • Aortic sclerosis    • Arthritis    • Dementia    • Depression    • ED (erectile dysfunction) of non-organic origin    • Hypercholesterolemia    • Hypertension    • Renal colic, bilateral    • Stroke        PAST SURGICAL " "HISTORY  Past Surgical History:   Procedure Laterality Date   • HERNIA REPAIR Left     inguinal sliding, left with mesh   • NECK SURGERY     • OTHER SURGICAL HISTORY      RENAL LITHOTRIPSY   • TONSILLECTOMY         FAMILY HISTORY  Family History   Problem Relation Age of Onset   • Hypertension Mother    • Stroke Mother    • Parkinsonism Father    • Pneumonia Father        SOCIAL HISTORY  Social History     Social History   • Marital status:      Spouse name: Loretta   • Number of children: 3   • Years of education: N/A     Occupational History   • Not on file.     Social History Main Topics   • Smoking status: Former Smoker   • Smokeless tobacco: Never Used      Comment: 17 PK YEARS   • Alcohol use No      Comment: STOPPED DRINKING, [employer] IN [employer]   • Drug use: No   • Sexual activity: Yes     Partners: Female     Birth control/ protection: None     Other Topics Concern   • Not on file     Social History Narrative       ALLERGIES  Aspirin; Lisinopril; Naproxen; Nsaids; and Penicillins    REVIEW OF SYSTEMS  Review of Systems   Constitutional: Negative for activity change, appetite change and fever.   HENT: Negative for congestion and sore throat.    Eyes: Negative.    Respiratory: Negative for cough and shortness of breath.    Cardiovascular: Negative for chest pain and leg swelling.   Gastrointestinal: Positive for abdominal pain (suprapubic) and vomiting. Negative for diarrhea.   Endocrine: Negative.    Genitourinary: Positive for hematuria (\"bright red\"). Negative for decreased urine volume and dysuria.   Musculoskeletal: Negative for neck pain.   Skin: Negative for rash and wound.   Allergic/Immunologic: Negative.    Neurological: Negative for weakness, numbness and headaches.   Hematological: Negative.    Psychiatric/Behavioral: Negative.    All other systems reviewed and are negative.      PHYSICAL EXAM  ED Triage Vitals   Temp Heart Rate Resp BP SpO2   09/16/17 0955 09/16/17 0956 09/16/17 0956 " 09/16/17 1009 09/16/17 0956   100.3 °F (37.9 °C) 64 16 166/78 95 %      Temp src Heart Rate Source Patient Position BP Location FiO2 (%)   09/16/17 0955 09/16/17 0956 09/16/17 1009 09/16/17 1009 --   Tympanic Monitor Lying Right arm        Physical Exam   Constitutional: He is oriented to person, place, and time. He appears distressed (mild).   HENT:   Head: Normocephalic and atraumatic.   Eyes: EOM are normal. Pupils are equal, round, and reactive to light.   Neck: Normal range of motion. Neck supple.   Cardiovascular: Normal rate, regular rhythm and normal heart sounds.  Exam reveals no gallop and no friction rub.    No murmur heard.  Pulmonary/Chest: Effort normal and breath sounds normal. No respiratory distress.   Abdominal: Soft. Bowel sounds are normal. He exhibits no distension and no mass. There is no tenderness. There is no rebound and no guarding.   No bladder distension.   Genitourinary: Testes/scrotum normal and penis normal. He exhibits no testicular tenderness and no scrotal tenderness.   Genitourinary Comments: Both testicles are descended, pt is circumcised.    Musculoskeletal: Normal range of motion. He exhibits no edema.   Neurological: He is alert and oriented to person, place, and time. He has normal sensation and normal strength.   Skin: Skin is warm and dry.   Psychiatric: Mood and affect normal.   Nursing note and vitals reviewed.      LAB RESULTS  Lab Results (last 24 hours)     Procedure Component Value Units Date/Time    CBC & Differential [[id number]] Collected:  09/16/17 1110    Specimen:  Blood Updated:  09/16/17 1152    Narrative:       The following orders were created for panel order CBC & Differential.  Procedure                               Abnormality         Status                     ---------                               -----------         ------                     CBC Auto Differential[[id number]]        Abnormal            Final result                 Please view results  for these tests on the individual orders.    Comprehensive Metabolic Panel [[id number]]  (Abnormal) Collected:  09/16/17 1110    Specimen:  Blood Updated:  09/16/17 1212     Glucose 173 (H) mg/dL      BUN 30 (H) mg/dL      Creatinine 1.38 (H) mg/dL      Sodium 147 (H) mmol/L      Potassium 4.0 mmol/L      Chloride 105 mmol/L      CO2 26.2 mmol/L      Calcium 10.1 mg/dL      Total Protein 7.4 g/dL      Albumin 5.00 g/dL      ALT (SGPT) 18 U/L      AST (SGOT) 20 U/L      Alkaline Phosphatase 64 U/L      Total Bilirubin 0.4 mg/dL      eGFR Non African Amer 50 (L) mL/min/1.73      Globulin 2.4 gm/dL      A/G Ratio 2.1 g/dL      BUN/Creatinine Ratio 21.7     Anion Gap 15.8 mmol/L     Narrative:       The MDRD GFR formula is only valid for adults with stable renal function between ages 18 and 70.    Protime-INR [[id number]]  (Normal) Collected:  09/16/17 1110    Specimen:  Blood Updated:  09/16/17 1202     Protime 13.4 Seconds      INR 1.06    Lipase [[id number]]  (Normal) Collected:  09/16/17 1110    Specimen:  Blood Updated:  09/16/17 1212     Lipase 41 U/L     CBC Auto Differential [[id number]]  (Abnormal) Collected:  09/16/17 1110    Specimen:  Blood Updated:  09/16/17 1152     WBC 10.22 10*3/mm3      RBC 4.33 (L) 10*6/mm3      Hemoglobin 13.7 g/dL      Hematocrit 41.4 %      MCV 95.6 fL      MCH 31.6 pg      MCHC 33.1 g/dL      RDW 13.3 %      RDW-SD 46.7 fl      MPV 12.2 (H) fL      Platelets 207 10*3/mm3      Neutrophil % 90.1 (H) %      Lymphocyte % 8.1 (L) %      Monocyte % 1.6 (L) %      Eosinophil % 0.0 (L) %      Basophil % 0.2 %      Immature Grans % 0.0 %      Neutrophils, Absolute 9.21 (H) 10*3/mm3      Lymphocytes, Absolute 0.83 (L) 10*3/mm3      Monocytes, Absolute 0.16 (L) 10*3/mm3      Eosinophils, Absolute 0.00 10*3/mm3      Basophils, Absolute 0.02 10*3/mm3      Immature Grans, Absolute 0.00 10*3/mm3     Urinalysis With / Culture If Indicated [[id number]]  (Abnormal) Collected:  09/16/17 1129    Specimen:   Urine from Urine, Clean Catch Updated:  09/16/17 1151     Color, UA Dark Yellow (A)     Appearance, UA Cloudy (A)     pH, UA 8.0     Specific Gravity, UA 1.017     Glucose,  mg/dL (Trace) (A)     Ketones, UA Trace (A)     Bilirubin, UA Negative     Blood, UA Large (3+) (A)     Protein, UA 30 mg/dL (1+) (A)     Leuk Esterase, UA Small (1+) (A)     Nitrite, UA Negative     Urobilinogen, UA 0.2 E.U./dL    Urinalysis, Microscopic Only [[id number]]  (Abnormal) Collected:  09/16/17 1129    Specimen:  Urine from Urine, Clean Catch Updated:  09/16/17 1151     RBC, UA Too Numerous to Count (A) /HPF      WBC, UA 6-12 (A) /HPF      Bacteria, UA None Seen /HPF      Squamous Epithelial Cells, UA 0-2 /HPF      Hyaline Casts, UA 0-2 /LPF      Methodology Automated Microscopy    Urine Culture [[id number]] Collected:  09/16/17 1129    Specimen:  Urine from Urine, Clean Catch Updated:  09/16/17 1149          I ordered the above labs and reviewed the results    RADIOLOGY  CT Abdomen Pelvis Without Contrast   Preliminary Result   1. A 0.6 cm distal right ureteral obstructive calculus with moderate   right hydronephrosis and hydroureter.   2. Moderate atheromatous calcification of the abdominal aorta.   3. Moderate right lower lobe parenchymal scarring.             CT Abd/Pel shows 6mm R uretal stone that is 2cm proximal to UVJ with mild hydronephrosis per Dr. Finn (radiologist).    I ordered the above noted radiological studies. Interpreted by radiologist. Reviewed by me in PACS.       PROCEDURES  Procedures      PROGRESS AND CONSULTS  ED Course     1034 Ordered CMP, CBC, EKG, Lipase, and Protime-INR for further evaluation    1101 Discussed with pt and family plan to order CT and labs for further evaluation to rule out nephrolithiasis    1117 Ordered UA for further evaluation    1138 Ordered 1g Tylenol for treatment of low grade fever    1213 BP- 143/78 HR- 68 Temp- 100.3 °F (37.9 °C) (Tympanic) O2 sat- 96%. Rechecked the patient  who is in NAD and is resting comfortably. Informed pt and family of CT results which show 6mm kidney stone. Per pt's daughter, the pt's Urologist is Dr. Cross. Informed pt of plan to consult Dr. Cross. Suggested pt return to the ED if he begins to experience new or worsening symptoms including a fever or worsening pain. Will discharge with cipro, flomax, zofran, and hydrocodone for pain and symptom relief. Suggested the pt follow up with Urology. Pt understands and agrees with the plan. All questions answered.    1218 Placed call to Dr. Cross (Urologist)    1232 Ordered 1g Rocephin for treatment UTI    1233 Dr. Aguilera states if the pt does not appear to be ill then he can be discharged. Suggested the pt follow up in the office.       MEDICAL DECISION MAKING  Results were reviewed/discussed with the patient and they were also made aware of online access. Pt also made aware that some labs, such as cultures, will not be resulted during ER visit and follow up with PMD is necessary.     MDM  Number of Diagnoses or Management Options     Amount and/or Complexity of Data Reviewed  Clinical lab tests: reviewed and ordered (INR - 1.06, WBC - 10.22, Lipase - 41)  Tests in the radiology section of CPT®: ordered and reviewed ( CT Abd/Pel shows 6mm R uretal stone that is 2cm proximal to UVJ with mild hydronephrosis.)  Decide to obtain previous medical records or to obtain history from someone other than the patient: yes  Review and summarize past medical records: yes  Discuss the patient with other providers: yes (Dr. Aguilera (urologist))  Independent visualization of images, tracings, or specimens: yes    Patient Progress  Patient progress: stable         DIAGNOSIS  Final diagnoses:   Ureteral stone with hydronephrosis       DISPOSITION  DISCHARGE    Patient discharged in stable condition.    Reviewed implications of results, diagnosis, meds, responsibility to follow up, warning signs and symptoms of possible worsening,  potential complications and reasons to return to ER, including new or worsening symptoms.    Patient/Family voiced understanding of above instructions.    Discussed plan for discharge, as there is no emergent indication for admission.  Pt/family is agreeable and understands need for follow up and repeat testing.  Pt is aware that discharge does not mean that nothing is wrong but it indicates no emergency is present that requires admission and they must continue care with follow-up as given below or physician of their choice.     FOLLOW-UP  Topher Choudhary MD  [address] [id number] [phone]    Schedule an appointment as soon as possible for a visit in 3 days           Medication List      New Prescriptions          ciprofloxacin 250 MG tablet   Commonly known as:  CIPRO   Take 1 tablet by mouth 2 (Two) Times a Day.       HYDROcodone-acetaminophen 5-325 MG per tablet   Commonly known as:  NORCO   Take 1 tablet by mouth Every 8 (Eight) Hours As Needed for Moderate Pain .         ondansetron ODT 4 MG disintegrating tablet   Commonly known as:  ZOFRAN-ODT   Take 1 tablet by mouth Every 6 (Six) Hours As Needed for Nausea.       tamsulosin 0.4 MG capsule 24 hr capsule   Commonly known as:  FLOMAX   Take 1 capsule by mouth Every Night.               Latest Documented Vital Signs:  As of 3:36 PM  BP- 152/67 HR- 64 Temp- 100.3 °F (37.9 °C) (Tympanic) O2 sat- 94%    --  Documentation assistance provided by aiden Green for Dr. Kirkland.  Information recorded by the scribe was done at my direction and has been verified and validated by me.         Richar Green  09/16/17 1493       Jose Francisco Kirkland MD  09/16/17 6822

## 2017-09-16 NOTE — ED NOTES
"Pt urine was a \"real dark red\", according to Pt's wife.      Reza Aguilera RN  09/16/17 1003    "

## 2017-09-18 LAB — BACTERIA SPEC AEROBE CULT: NO GROWTH

## 2017-09-19 ENCOUNTER — TELEPHONE (OUTPATIENT)
Dept: SOCIAL WORK | Facility: HOSPITAL | Age: 77
End: 2017-09-19

## 2017-09-19 NOTE — TELEPHONE ENCOUNTER
ED f/u phone call: spoke w/ wife, who sates that patient is taking prescribed meds, feeling better and had f/u lazaro't w/ urologist tomorrow. No questions/concerns

## 2017-10-19 ENCOUNTER — OFFICE VISIT (OUTPATIENT)
Dept: FAMILY MEDICINE CLINIC | Facility: CLINIC | Age: 77
End: 2017-10-19

## 2017-10-19 VITALS
BODY MASS INDEX: 23.62 KG/M2 | DIASTOLIC BLOOD PRESSURE: 72 MMHG | TEMPERATURE: 98 F | OXYGEN SATURATION: 93 % | WEIGHT: 165 LBS | SYSTOLIC BLOOD PRESSURE: 142 MMHG | HEART RATE: 67 BPM | HEIGHT: 70 IN

## 2017-10-19 DIAGNOSIS — I10 ESSENTIAL HYPERTENSION: Primary | ICD-10-CM

## 2017-10-19 DIAGNOSIS — R26.81 GAIT INSTABILITY: ICD-10-CM

## 2017-10-19 DIAGNOSIS — F41.8 MIXED ANXIETY DEPRESSIVE DISORDER: ICD-10-CM

## 2017-10-19 DIAGNOSIS — F02.80 DEMENTIA DUE TO PARKINSON'S DISEASE WITHOUT BEHAVIORAL DISTURBANCE (HCC): ICD-10-CM

## 2017-10-19 DIAGNOSIS — R41.9 COGNITIVE COMPLAINTS: ICD-10-CM

## 2017-10-19 DIAGNOSIS — G20 DEMENTIA DUE TO PARKINSON'S DISEASE WITHOUT BEHAVIORAL DISTURBANCE (HCC): ICD-10-CM

## 2017-10-19 PROCEDURE — 99213 OFFICE O/P EST LOW 20 MIN: CPT | Performed by: NURSE PRACTITIONER

## 2017-10-19 RX ORDER — PRAVASTATIN SODIUM 40 MG
40 TABLET ORAL DAILY
Qty: 90 TABLET | Refills: 1 | Status: SHIPPED | OUTPATIENT
Start: 2017-10-19 | End: 2018-01-31 | Stop reason: SDUPTHER

## 2017-10-19 RX ORDER — AMLODIPINE BESYLATE 5 MG/1
TABLET ORAL
Qty: 135 TABLET | Refills: 1 | Status: SHIPPED | OUTPATIENT
Start: 2017-10-19 | End: 2018-01-31 | Stop reason: SDUPTHER

## 2017-10-19 RX ORDER — ESCITALOPRAM OXALATE 10 MG/1
10 TABLET ORAL DAILY
Qty: 90 TABLET | Refills: 1 | Status: SHIPPED | OUTPATIENT
Start: 2017-10-19 | End: 2018-01-31 | Stop reason: DRUGHIGH

## 2017-10-19 RX ORDER — CLOPIDOGREL BISULFATE 75 MG/1
75 TABLET ORAL DAILY
Qty: 90 TABLET | Refills: 1 | Status: SHIPPED | OUTPATIENT
Start: 2017-10-19 | End: 2018-01-31 | Stop reason: SDUPTHER

## 2017-10-19 NOTE — PROGRESS NOTES
Subjective   Aakash Martinez is a 77 y.o. male.     HPI Comments: Follow-up hypertension presently controlledEssential hypertensionLong-standing  142/72 today    History of recurrent falls  Thought to have Parkinson's disease, the diagnosis is in question been diagnosed with dementia  Patient has history of depression anxiety taking Lexapro 20 mg  He had been bradycardic it was thought that this may been attributed by the Aricept but discontinued  Taking Namenda presently and sees neurology    He's had a slow decline cognitively is much more difficult time completing simple tasks  Continues to have gait difficulty balance problems  Needs a walker previously he was refusing, but now he is more receptive  Insurance would not cover his previous walker you step    He has supervision at all times,  Wife is very supportive  He realizes more that he needs more help and that he probably needs to use his walker    He has no new chest pain shortness of breath no new headaches or visual loss           The following portions of the patient's history were reviewed and updated as appropriate: allergies, current medications, past family history, past social history, past surgical history and problem list.    Review of Systems   Constitutional: Negative for chills, fatigue, fever and unexpected weight change.   HENT: Negative.    Respiratory: Negative.    Cardiovascular: Negative.    Gastrointestinal: Negative.    Genitourinary: Negative.    Musculoskeletal: Positive for gait problem.   Neurological: Positive for weakness. Negative for dizziness, tremors, syncope, facial asymmetry, speech difficulty, light-headedness, numbness and headaches.   Psychiatric/Behavioral: Positive for confusion and decreased concentration. The patient is nervous/anxious.        Objective   Physical Exam   Constitutional: He appears well-developed.   HENT:   Head: Normocephalic.   Eyes: Pupils are equal, round, and reactive to light.   Pulmonary/Chest:  Effort normal and breath sounds normal.   Abdominal: Soft. Bowel sounds are normal.   Musculoskeletal:   Gait instability, small steps shuffling,  Lack of normal arms swaying    No focal weakness   Lymphadenopathy:     He has no cervical adenopathy.   Neurological: He is alert. He displays normal reflexes. No cranial nerve deficit. He exhibits normal muscle tone. Coordination abnormal.   Oriented person place  Less able to answer problem-solving questions and previous baseline   Psychiatric:   affect generally flat, occasionally smiles quite pleasant   Vitals reviewed.      Assessment/Plan   Aakash was seen today for wound check.    Diagnoses and all orders for this visit:    Essential hypertension    Mixed anxiety depressive disorder    Gait instability    Cognitive complaints    Dementia due to Parkinson's disease without behavioral disturbance    Other orders  -     amLODIPine (NORVASC) 5 MG tablet; Take 1 1/2 tab daily  -     clopidogrel (PLAVIX) 75 MG tablet; Take 1 tablet by mouth Daily.  -     escitalopram (LEXAPRO) 10 MG tablet; Take 1 tablet by mouth Daily. Patient to call when needed, do not fill now  -     pravastatin (PRAVACHOL) 40 MG tablet; Take 1 tablet by mouth Daily.                  Continue present medication   follow-up with neurology  Decreased Lexapro to 10 mg  hhe's tolerating the medicine well but he's had a decline since last visit  Likely not related   Any acute weakness slurred speech severe headache  Syncope  Emergency room  ffollow-up in three months  Needs a walker  False precaution  Injury precaution  Safety precautions  ccontinue direct supervision  He does not drive  Surrendered his license

## 2017-10-23 PROBLEM — G20 DEMENTIA DUE TO PARKINSON'S DISEASE WITHOUT BEHAVIORAL DISTURBANCE (HCC): Status: ACTIVE | Noted: 2017-10-23

## 2017-10-23 PROBLEM — F02.80 DEMENTIA DUE TO PARKINSON'S DISEASE WITHOUT BEHAVIORAL DISTURBANCE (HCC): Status: ACTIVE | Noted: 2017-10-23

## 2017-12-04 ENCOUNTER — OFFICE VISIT (OUTPATIENT)
Dept: FAMILY MEDICINE CLINIC | Facility: CLINIC | Age: 77
End: 2017-12-04

## 2017-12-04 VITALS
HEIGHT: 70 IN | BODY MASS INDEX: 24.08 KG/M2 | OXYGEN SATURATION: 98 % | WEIGHT: 168.2 LBS | SYSTOLIC BLOOD PRESSURE: 132 MMHG | DIASTOLIC BLOOD PRESSURE: 68 MMHG | HEART RATE: 50 BPM

## 2017-12-04 DIAGNOSIS — R26.81 GAIT INSTABILITY: ICD-10-CM

## 2017-12-04 DIAGNOSIS — F32.A DEPRESSION, UNSPECIFIED DEPRESSION TYPE: ICD-10-CM

## 2017-12-04 DIAGNOSIS — F03.90 DEMENTIA WITHOUT BEHAVIORAL DISTURBANCE, UNSPECIFIED DEMENTIA TYPE: Primary | ICD-10-CM

## 2017-12-04 DIAGNOSIS — G62.89 OTHER POLYNEUROPATHY: ICD-10-CM

## 2017-12-04 DIAGNOSIS — E78.00 HYPERCHOLESTEROLEMIA: ICD-10-CM

## 2017-12-04 DIAGNOSIS — I10 ESSENTIAL HYPERTENSION: ICD-10-CM

## 2017-12-04 PROCEDURE — 99213 OFFICE O/P EST LOW 20 MIN: CPT | Performed by: NURSE PRACTITIONER

## 2017-12-04 NOTE — PROGRESS NOTES
Follow-up how blood pressure presently controlled  He's doing well with anxiety depression with Lexapro    He continues to have gait problems but no worsening  Feels like he wants to fall forward this is chronic  He has memory problems and sometimes episodes of confusion    He's been diagnosed with dementia  Initially it was thought that he has Parkinson's but I think his neurologist now  Thinks this is not the issue

## 2017-12-04 NOTE — PATIENT INSTRUCTIONS
Call for an appointment  In 3 months  [facility]      [address] [phone]

## 2017-12-06 LAB
ALBUMIN SERPL-MCNC: 5 G/DL (ref 3.5–5.2)
ALBUMIN/GLOB SERPL: 2 G/DL
ALP SERPL-CCNC: 81 U/L (ref 39–117)
ALT SERPL-CCNC: 15 U/L (ref 1–41)
APPEARANCE UR: CLEAR
AST SERPL-CCNC: 17 U/L (ref 1–40)
BACTERIA #/AREA URNS HPF: NORMAL /HPF
BASOPHILS # BLD AUTO: 0.03 10*3/MM3 (ref 0–0.2)
BASOPHILS NFR BLD AUTO: 0.5 % (ref 0–1.5)
BILIRUB SERPL-MCNC: 0.5 MG/DL (ref 0.1–1.2)
BILIRUB UR QL STRIP: NEGATIVE
BUN SERPL-MCNC: 19 MG/DL (ref 8–23)
BUN/CREAT SERPL: 18.1 (ref 7–25)
CALCIUM SERPL-MCNC: 9.9 MG/DL (ref 8.6–10.5)
CHLORIDE SERPL-SCNC: 103 MMOL/L (ref 98–107)
CHOLEST SERPL-MCNC: 171 MG/DL (ref 0–200)
CO2 SERPL-SCNC: 32.3 MMOL/L (ref 22–29)
COLOR UR: YELLOW
CREAT SERPL-MCNC: 1.05 MG/DL (ref 0.76–1.27)
EOSINOPHIL # BLD AUTO: 0.27 10*3/MM3 (ref 0–0.7)
EOSINOPHIL NFR BLD AUTO: 4.4 % (ref 0.3–6.2)
EPI CELLS #/AREA URNS HPF: NORMAL /HPF
ERYTHROCYTE [DISTWIDTH] IN BLOOD BY AUTOMATED COUNT: 13.5 % (ref 11.5–14.5)
FOLATE SERPL-MCNC: >20 NG/ML (ref 4.78–24.2)
GFR SERPLBLD CREATININE-BSD FMLA CKD-EPI: 68 ML/MIN/1.73
GFR SERPLBLD CREATININE-BSD FMLA CKD-EPI: 83 ML/MIN/1.73
GLOBULIN SER CALC-MCNC: 2.5 GM/DL
GLUCOSE SERPL-MCNC: 85 MG/DL (ref 65–99)
GLUCOSE UR QL: NEGATIVE
HCT VFR BLD AUTO: 46.2 % (ref 40.4–52.2)
HDLC SERPL-MCNC: 90 MG/DL (ref 40–60)
HGB BLD-MCNC: 14.9 G/DL (ref 13.7–17.6)
HGB UR QL STRIP: NEGATIVE
IMM GRANULOCYTES # BLD: 0 10*3/MM3 (ref 0–0.03)
IMM GRANULOCYTES NFR BLD: 0 % (ref 0–0.5)
KETONES UR QL STRIP: NEGATIVE
LDLC SERPL CALC-MCNC: 64 MG/DL (ref 0–100)
LDLC/HDLC SERPL: 0.71 {RATIO}
LEUKOCYTE ESTERASE UR QL STRIP: NEGATIVE
LYMPHOCYTES # BLD AUTO: 2.4 10*3/MM3 (ref 0.9–4.8)
LYMPHOCYTES NFR BLD AUTO: 39 % (ref 19.6–45.3)
MCH RBC QN AUTO: 31.7 PG (ref 27–32.7)
MCHC RBC AUTO-ENTMCNC: 32.3 G/DL (ref 32.6–36.4)
MCV RBC AUTO: 98.3 FL (ref 79.8–96.2)
MICRO URNS: NORMAL
MICRO URNS: NORMAL
MONOCYTES # BLD AUTO: 0.5 10*3/MM3 (ref 0.2–1.2)
MONOCYTES NFR BLD AUTO: 8.1 % (ref 5–12)
MUCOUS THREADS URNS QL MICRO: PRESENT /HPF
NEUTROPHILS # BLD AUTO: 2.95 10*3/MM3 (ref 1.9–8.1)
NEUTROPHILS NFR BLD AUTO: 48 % (ref 42.7–76)
NITRITE UR QL STRIP: NEGATIVE
PH UR STRIP: 6.5 [PH] (ref 5–7.5)
PLATELET # BLD AUTO: 219 10*3/MM3 (ref 140–500)
POTASSIUM SERPL-SCNC: 3.9 MMOL/L (ref 3.5–5.2)
PROT SERPL-MCNC: 7.5 G/DL (ref 6–8.5)
PROT UR QL STRIP: NEGATIVE
RBC # BLD AUTO: 4.7 10*6/MM3 (ref 4.6–6)
RBC #/AREA URNS HPF: NORMAL /HPF
RPR SER QL: NORMAL
SODIUM SERPL-SCNC: 147 MMOL/L (ref 136–145)
SP GR UR: 1.02 (ref 1–1.03)
TRIGL SERPL-MCNC: 84 MG/DL (ref 0–150)
TSH SERPL DL<=0.005 MIU/L-ACNC: 1.52 MIU/ML (ref 0.27–4.2)
URINALYSIS REFLEX: NORMAL
UROBILINOGEN UR STRIP-MCNC: 0.2 MG/DL (ref 0.2–1)
VIT B12 SERPL-MCNC: 656 PG/ML (ref 211–946)
VLDLC SERPL CALC-MCNC: 16.8 MG/DL (ref 5–40)
WBC # BLD AUTO: 6.15 10*3/MM3 (ref 4.5–10.7)
WBC #/AREA URNS HPF: NORMAL /HPF

## 2017-12-11 PROBLEM — F03.90 DEMENTIA WITHOUT BEHAVIORAL DISTURBANCE (HCC): Status: ACTIVE | Noted: 2017-12-11

## 2017-12-11 NOTE — PROGRESS NOTES
Subjective   Aakash Martinez is a 77 y.o. male.     HPI Comments: Follow-up essential hypertension long-standing presently controlled  He's doing well with anxiety depression with Lexapro  No change with 10 mg doing well  We'll continue with this dose    He continues to have gait problems but no worsening  Feels like he wants to fall forward this is chronic  He has memory problems and sometimes episodes of confusion    He's been diagnosed with dementia  Initially it was thought that he has Parkinson's but I think his neurologist now  Thinks this is not the issue                       The following portions of the patient's history were reviewed and updated as appropriate: allergies, current medications, past medical history, past social history, past surgical history and problem list.    Review of Systems   Constitutional: Negative.  Negative for appetite change, chills, fatigue, fever and unexpected weight change.   HENT: Negative for congestion, ear pain and sore throat.    Eyes: Negative.    Respiratory: Negative for cough, chest tightness, shortness of breath and wheezing.    Cardiovascular: Negative for chest pain, palpitations and leg swelling.   Gastrointestinal: Negative for abdominal pain and constipation.   Genitourinary: Negative for difficulty urinating, dysuria and urgency.   Musculoskeletal: Negative for arthralgias and myalgias.   Skin: Negative for rash and wound.   Neurological: Negative for dizziness, tremors, seizures, syncope, facial asymmetry, speech difficulty, weakness, light-headedness, numbness and headaches.        Balance and gait difficulty  Mild confusion  Memory problems   Psychiatric/Behavioral: Negative for sleep disturbance. The patient is nervous/anxious.        Objective   Physical Exam   Constitutional: He is oriented to person, place, and time. He appears well-developed and well-nourished.   HENT:   Head: Normocephalic.   Nose: Nose normal.   Mouth/Throat: Oropharynx is clear and  moist.   Tm clear debora no mass canal patent without d/c   Eyes: Conjunctivae are normal. Pupils are equal, round, and reactive to light. No scleral icterus.   Neck: Neck supple. No JVD present. No thyromegaly present.   Cardiovascular: Normal rate, regular rhythm and normal heart sounds.  Exam reveals no gallop and no friction rub.    No murmur heard.  Pulmonary/Chest: Effort normal and breath sounds normal. No stridor. No respiratory distress. He has no wheezes. He has no rales.   Abdominal: Soft. Bowel sounds are normal. He exhibits no distension. There is no tenderness.   No hepatosplenomegaly, no ascites,   Musculoskeletal: He exhibits no edema or tenderness.   Lymphadenopathy:     He has no cervical adenopathy.   Neurological: He is alert and oriented to person, place, and time. He has normal reflexes. He displays normal reflexes. No cranial nerve deficit. He exhibits normal muscle tone. Coordination abnormal.   Balance and gait difficulty without vertigo  Shuffling somewhat  Affect flat pleasant  Cranial nerves II through XII grossly intact   Skin: Skin is warm and dry. No rash noted. No erythema.   Psychiatric: He has a normal mood and affect. His behavior is normal. Judgment and thought content normal.   Vitals reviewed.      Assessment/Plan   Aakash was seen today for follow-up.    Diagnoses and all orders for this visit:    Dementia without behavioral disturbance, unspecified dementia type  -     TSH Rfx On Abnormal To Free T4  -     Comprehensive Metabolic Panel  -     CBC & Differential  -     Lipid Panel With LDL / HDL Ratio  -     RPR  -     Vitamin B12 & Folate  -     Urinalysis With / Culture If Indicated - Urine, Clean Catch    Essential hypertension  -     TSH Rfx On Abnormal To Free T4  -     Comprehensive Metabolic Panel  -     CBC & Differential  -     Lipid Panel With LDL / HDL Ratio  -     RPR  -     Vitamin B12 & Folate  -     Urinalysis With / Culture If Indicated - Urine, Clean  Catch    Hypercholesterolemia  -     TSH Rfx On Abnormal To Free T4  -     Comprehensive Metabolic Panel  -     CBC & Differential  -     Lipid Panel With LDL / HDL Ratio  -     RPR  -     Vitamin B12 & Folate  -     Urinalysis With / Culture If Indicated - Urine, Clean Catch    Other polyneuropathy  -     TSH Rfx On Abnormal To Free T4  -     Comprehensive Metabolic Panel  -     CBC & Differential  -     Lipid Panel With LDL / HDL Ratio  -     RPR  -     Vitamin B12 & Folate  -     Urinalysis With / Culture If Indicated - Urine, Clean Catch    Depression, unspecified depression type  -     TSH Rfx On Abnormal To Free T4  -     Comprehensive Metabolic Panel  -     CBC & Differential  -     Lipid Panel With LDL / HDL Ratio  -     RPR  -     Vitamin B12 & Folate  -     Urinalysis With / Culture If Indicated - Urine, Clean Catch    Gait instability    Other orders  -     Microscopic Examination                  Continue Lexapro 10 mg  No change in blood pressure  \Avoid hypotension  Continue Namenda  Falls precautions  Discussed dementia  Depression anxiety  Follow-up neurology    Call for an appointment  In 3 months  [facility]      [address] [phone]

## 2018-01-31 ENCOUNTER — OFFICE VISIT (OUTPATIENT)
Dept: INTERNAL MEDICINE | Facility: CLINIC | Age: 78
End: 2018-01-31

## 2018-01-31 VITALS
OXYGEN SATURATION: 97 % | HEART RATE: 55 BPM | SYSTOLIC BLOOD PRESSURE: 126 MMHG | HEIGHT: 70 IN | DIASTOLIC BLOOD PRESSURE: 80 MMHG | BODY MASS INDEX: 23.92 KG/M2 | WEIGHT: 167.1 LBS

## 2018-01-31 DIAGNOSIS — R26.81 GAIT INSTABILITY: Primary | ICD-10-CM

## 2018-01-31 DIAGNOSIS — F32.A DEPRESSION, UNSPECIFIED DEPRESSION TYPE: ICD-10-CM

## 2018-01-31 DIAGNOSIS — E78.00 HYPERCHOLESTEROLEMIA: ICD-10-CM

## 2018-01-31 DIAGNOSIS — I10 ESSENTIAL HYPERTENSION: ICD-10-CM

## 2018-01-31 DIAGNOSIS — H10.13 ALLERGIC CONJUNCTIVITIS OF BOTH EYES: ICD-10-CM

## 2018-01-31 DIAGNOSIS — I25.10 CAD IN NATIVE ARTERY: ICD-10-CM

## 2018-01-31 PROCEDURE — 99215 OFFICE O/P EST HI 40 MIN: CPT | Performed by: FAMILY MEDICINE

## 2018-01-31 RX ORDER — PRAVASTATIN SODIUM 40 MG
40 TABLET ORAL DAILY
Qty: 90 TABLET | Refills: 1 | Status: SHIPPED | OUTPATIENT
Start: 2018-01-31 | End: 2018-04-23 | Stop reason: SDUPTHER

## 2018-01-31 RX ORDER — AMLODIPINE BESYLATE 5 MG/1
TABLET ORAL
Qty: 135 TABLET | Refills: 1 | Status: SHIPPED | OUTPATIENT
Start: 2018-01-31 | End: 2018-05-18

## 2018-01-31 RX ORDER — ESCITALOPRAM OXALATE 5 MG/1
5 TABLET ORAL DAILY
Qty: 30 TABLET | Refills: 3 | Status: SHIPPED | OUTPATIENT
Start: 2018-01-31 | End: 2018-04-23 | Stop reason: SDUPTHER

## 2018-01-31 RX ORDER — CLOPIDOGREL BISULFATE 75 MG/1
75 TABLET ORAL DAILY
Qty: 90 TABLET | Refills: 1 | Status: SHIPPED | OUTPATIENT
Start: 2018-01-31 | End: 2018-04-23 | Stop reason: SDUPTHER

## 2018-01-31 RX ORDER — OLOPATADINE HYDROCHLORIDE 2 MG/ML
1 SOLUTION/ DROPS OPHTHALMIC DAILY
Qty: 3 BOTTLE | Refills: 1 | Status: SHIPPED | OUTPATIENT
Start: 2018-01-31

## 2018-02-26 ENCOUNTER — DOCUMENTATION (OUTPATIENT)
Dept: PHYSICAL THERAPY | Facility: HOSPITAL | Age: 78
End: 2018-02-26

## 2018-02-26 DIAGNOSIS — R26.81 GAIT INSTABILITY: ICD-10-CM
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
## 2018-02-26 DIAGNOSIS — R26.89 BALANCE PROBLEM: Primary | ICD-10-CM

## 2018-02-26 DIAGNOSIS — Z91.81 RISK FOR FALLS: ICD-10-CM

## 2018-02-26 NOTE — THERAPY DISCHARGE NOTE
Outpatient Physical Therapy Discharge Summary         Patient Name: Aakash Martinez  : 1940  MRN: [id number]    Today's Date: 2018    Visit Dx:    ICD-10-CM ICD-9-CM   1. Balance problem R26.89 781.99   2. Risk for falls Z91.81 V15.88   3. Gait instability R26.81 781.2   4. Falls, subsequent encounter W19.XXXD V58.89     E888.9             PT OP Goals       18 0800       PT Short Term Goals    STG 1 Patient will be independent and compliant with initial HEP for posture, ROM/flexibility, and basic strengthening.   -     STG 1 Progress Met  -     STG 2 Patient/spouse will report >/= 25% increased ease with ADL's/transitional movements  -     STG 2 Progress Met  -     STG 3 Patient will demonstrate improving core/LE extremity strength and balance by improvement in the 5 Times Sit to Stand Test from 33 seconds to </= 28 seconds   -     STG 3 Progress Met  -     STG 4 Patient will improve REEVES balance score to >/= 38/56  -     STG 4 Progress Progressing  -     STG 5 Patient will be educated on and demonstrate ability to transition from floor to standing independently for increased ease getting up from floor/ground  -     STG 5 Progress Not Met  -     STG 5 Progress Comments pt missed last appt  -     Long Term Goals    LTG 1 Patient/spouse will be independent with established HEP for strength/stabilization and balance to facilitate self management of condition  -     LTG 1 Progress Partially Met  -     LTG 1 Progress Comments cues required due to memory loss  -     LTG 2 Patient will demonstrate improving core/LE extremity strength and balance by improvement in the 5 Times Sit to Stand Test from 33 seconds to </= 24 seconds   -     LTG 2 Progress Met  -     LTG 3 Patient will show decreased falls risk and improved safety with functional movements by improvement in the Times Get Up and Go Test from 26 seconds to </= 18 seconds   -     LTG 3 Progress Ongoing;Not Met   -DEAN     LTG 3 Progress Comments pt did not attend last appt for reassessment  -     LTG 4 Patient will report reduced functional limitations on LEFS with score >= 34/80.  -DEAN     LTG 4 Progress Ongoing;Not Met  -DEAN     LTG 4 Progress Comments pt did not attend last appt for reassessment  -     LTG 5 Patient will improve REEVES balance score to >/= 44/56  -     LTG 5 Progress Not Met  -DEAN     LTG 5 Progress Comments 40/56  -     LTG 6 Patient will demonstrate improvement in gait mechanics to allow him to safely ambulate >/= 300' during 2 minute walk test  -     LTG 6 Progress Not Met  -DEAN     LTG 6 Progress Comments 260'  -DEAN       User Key  (r) = Recorded By, (t) = Taken By, (c) = Cosigned By    Initials Name Provider Type    DEAN Matos, PT Physical Therapist          OP PT Discharge Summary:  Pt was seen for 11 visits for balance and gait instability and weakness.  He progressed  In therapy however not all goals were met.  Date of Discharge: 02/26/18  Reason for Discharge: Maximum functional potential achieved  Outcomes Achieved: Patient able to partially acheive established goals  Discharge Destination: Home with home program  Discharge Instructions: pt did not attend final visit scheduled but had HEP print-out progressions from previous visit.       Time Calculation:                    Addie Matos, PT  2/26/2018

## 2018-03-06 ENCOUNTER — APPOINTMENT (OUTPATIENT)
Dept: GENERAL RADIOLOGY | Facility: HOSPITAL | Age: 78
End: 2018-03-06

## 2018-03-06 PROCEDURE — 73110 X-RAY EXAM OF WRIST: CPT | Performed by: GENERAL PRACTICE

## 2018-03-09 ENCOUNTER — OFFICE VISIT (OUTPATIENT)
Dept: INTERNAL MEDICINE | Facility: CLINIC | Age: 78
End: 2018-03-09

## 2018-03-09 VITALS
DIASTOLIC BLOOD PRESSURE: 78 MMHG | OXYGEN SATURATION: 97 % | SYSTOLIC BLOOD PRESSURE: 122 MMHG | BODY MASS INDEX: 22.21 KG/M2 | WEIGHT: 164 LBS | HEIGHT: 72 IN | HEART RATE: 48 BPM

## 2018-03-09 DIAGNOSIS — S60.221D CONTUSION OF RIGHT HAND, SUBSEQUENT ENCOUNTER: Primary | ICD-10-CM

## 2018-03-09 DIAGNOSIS — S61.411D LACERATION OF RIGHT HAND WITHOUT FOREIGN BODY, SUBSEQUENT ENCOUNTER: ICD-10-CM

## 2018-03-09 PROBLEM — S61.411A LACERATION OF RIGHT HAND WITHOUT FOREIGN BODY: Status: ACTIVE | Noted: 2018-03-09

## 2018-03-09 PROBLEM — S60.221A CONTUSION OF RIGHT HAND: Status: ACTIVE | Noted: 2018-03-09

## 2018-03-09 PROCEDURE — 99213 OFFICE O/P EST LOW 20 MIN: CPT | Performed by: FAMILY MEDICINE

## 2018-04-23 ENCOUNTER — OFFICE VISIT (OUTPATIENT)
Dept: INTERNAL MEDICINE | Facility: CLINIC | Age: 78
End: 2018-04-23

## 2018-04-23 ENCOUNTER — RESULTS ENCOUNTER (OUTPATIENT)
Dept: INTERNAL MEDICINE | Facility: CLINIC | Age: 78
End: 2018-04-23

## 2018-04-23 VITALS
BODY MASS INDEX: 22.85 KG/M2 | HEART RATE: 52 BPM | WEIGHT: 168.7 LBS | DIASTOLIC BLOOD PRESSURE: 72 MMHG | OXYGEN SATURATION: 98 % | SYSTOLIC BLOOD PRESSURE: 136 MMHG | HEIGHT: 72 IN

## 2018-04-23 DIAGNOSIS — G20 DEMENTIA DUE TO PARKINSON'S DISEASE WITHOUT BEHAVIORAL DISTURBANCE (HCC): ICD-10-CM

## 2018-04-23 DIAGNOSIS — E78.00 HYPERCHOLESTEROLEMIA: ICD-10-CM

## 2018-04-23 DIAGNOSIS — E78.00 HYPERCHOLESTEROLEMIA: Primary | ICD-10-CM

## 2018-04-23 DIAGNOSIS — I25.10 CAD IN NATIVE ARTERY: ICD-10-CM

## 2018-04-23 DIAGNOSIS — F02.80 DEMENTIA DUE TO PARKINSON'S DISEASE WITHOUT BEHAVIORAL DISTURBANCE (HCC): ICD-10-CM

## 2018-04-23 DIAGNOSIS — M79.641 RIGHT HAND PAIN: ICD-10-CM

## 2018-04-23 DIAGNOSIS — F32.A DEPRESSION, UNSPECIFIED DEPRESSION TYPE: ICD-10-CM

## 2018-04-23 PROCEDURE — 99214 OFFICE O/P EST MOD 30 MIN: CPT | Performed by: FAMILY MEDICINE

## 2018-04-23 RX ORDER — ESCITALOPRAM OXALATE 5 MG/1
5 TABLET ORAL DAILY
Qty: 90 TABLET | Refills: 3 | Status: SHIPPED | OUTPATIENT
Start: 2018-04-23 | End: 2018-10-08 | Stop reason: SDUPTHER

## 2018-04-23 RX ORDER — MEMANTINE HYDROCHLORIDE 28 MG/1
28 CAPSULE, EXTENDED RELEASE ORAL DAILY
Qty: 90 CAPSULE | Refills: 3 | Status: SHIPPED | OUTPATIENT
Start: 2018-04-23

## 2018-04-23 RX ORDER — PRAVASTATIN SODIUM 40 MG
40 TABLET ORAL DAILY
Qty: 90 TABLET | Refills: 1 | Status: SHIPPED | OUTPATIENT
Start: 2018-04-23 | End: 2018-10-08 | Stop reason: SDUPTHER

## 2018-04-23 RX ORDER — CLOPIDOGREL BISULFATE 75 MG/1
75 TABLET ORAL DAILY
Qty: 90 TABLET | Refills: 1 | Status: SHIPPED | OUTPATIENT
Start: 2018-04-23 | End: 2018-08-01 | Stop reason: SDUPTHER

## 2018-04-23 NOTE — PROGRESS NOTES
Subjective   Aakash Martinez is a 77 y.o. male.     Chief Complaint   Patient presents with   • Hypertension   • Hyperlipidemia   • Depression         History of Present Illness     Patient presents today with a past medical history of coronary artery disease.  Patient takes Plavix daily.  Patient states that he stable taking this medication.    Patient's past medical history of hypercholesterolemia.  He's currently taking pravastatin 40 mg daily.  Denies any side effects of the medication.    Patient notes that he's currently taking Lexapro 5 mg daily for his depression.  His wife is present at today's office visit stating that his depression might not be getting better.  However she's unsure if the depression has worsened due to his dementia from Parkinson's.    Patient has a past medical history dementia from Parkinson's.  His wife states at today's office visit that he seems to be getting worse.  She states that he used to be a  for 20 years, and yet he cannot tell anything that the sequence.  He has hard time recalling years.  He is a hard time recalling words that are stated to him.  Patient unable to even do simple math.  His wife's concern that his dementia is progressing.  She states that he has a neurology appointment coming up soon.    Patient notes that his right hand has healed up since his contusion at the last office visit.  However there is a hard mass located the area.  Area continues to provide patient discomfort especially on palpation.    The following portions of the patient's history were reviewed and updated as appropriate: allergies, current medications, past family history, past medical history, past social history, past surgical history and problem list.    Review of Systems   Constitutional: Negative for chills and fever.   HENT: Negative for congestion, rhinorrhea, sinus pain and sore throat.    Eyes: Negative for photophobia and visual disturbance.   Respiratory: Negative  for cough, chest tightness and shortness of breath.    Cardiovascular: Negative for chest pain and palpitations.   Gastrointestinal: Negative for diarrhea, nausea and vomiting.   Genitourinary: Negative for dysuria, frequency and urgency.   Skin: Negative for rash and wound.   Neurological: Negative for dizziness and syncope.   Psychiatric/Behavioral: Negative for behavioral problems and confusion.       Objective   Physical Exam   Constitutional: He appears well-developed and well-nourished.   HENT:   Head: Normocephalic and atraumatic.   Right Ear: External ear normal.   Left Ear: External ear normal.   Mouth/Throat: Oropharynx is clear and moist.   Eyes: EOM are normal.   Neck: Normal range of motion. Neck supple.   Cardiovascular: Normal rate, regular rhythm and normal heart sounds.    Pulmonary/Chest: Effort normal and breath sounds normal. No respiratory distress.   Musculoskeletal: Normal range of motion.        Hands:  Lymphadenopathy:     He has no cervical adenopathy.   Neurological: He is alert. He is disoriented. Gait abnormal.   Patient walks with a shuffling gait.  Patient lacks ability to recall words.  Patient is unaware of his surroundings as well.  He is only oriented to location.   Skin: Skin is warm.   Psychiatric: He has a normal mood and affect. His behavior is normal.   Nursing note and vitals reviewed.      Assessment/Plan   Aakash was seen today for hypertension, hyperlipidemia and depression.    Diagnoses and all orders for this visit:    Hypercholesterolemia  -     pravastatin (PRAVACHOL) 40 MG tablet; Take 1 tablet by mouth Daily.  -     Comprehensive Metabolic Panel; Future  -     Lipid Panel With LDL / HDL Ratio; Future    CAD in native artery  -     clopidogrel (PLAVIX) 75 MG tablet; Take 1 tablet by mouth Daily.    Depression, unspecified depression type  -     escitalopram (LEXAPRO) 5 MG tablet; Take 1 tablet by mouth Daily.  -     Continue Current dose at this time.  Will reevaluate  next office visit, and may increase the dose that time.    Dementia due to Parkinson's disease without behavioral disturbance  -     memantine (NAMENDA XR) 28 MG capsule sustained-release 24 hr extended release capsule; Take 1 capsule by mouth Daily.  -     Advised the patient and patient's wife that he will need to see neurology for further evaluation and treatment.  Patient may need additional medication to help slow the progression of the dementia.    Right hand pain  -     Ambulatory Referral to Hand Surgery          No Follow-up on file.    Dictated utilizing Dragon Voice Recognition Software

## 2018-04-24 LAB
ALBUMIN SERPL-MCNC: 4.6 G/DL (ref 3.5–5.2)
ALBUMIN/GLOB SERPL: 2 G/DL
ALP SERPL-CCNC: 67 U/L (ref 39–117)
ALT SERPL-CCNC: 9 U/L (ref 1–41)
AST SERPL-CCNC: 17 U/L (ref 1–40)
BILIRUB SERPL-MCNC: 0.5 MG/DL (ref 0.1–1.2)
BUN SERPL-MCNC: 21 MG/DL (ref 8–23)
BUN/CREAT SERPL: 19.3 (ref 7–25)
CALCIUM SERPL-MCNC: 9.5 MG/DL (ref 8.6–10.5)
CHLORIDE SERPL-SCNC: 102 MMOL/L (ref 98–107)
CHOLEST SERPL-MCNC: 160 MG/DL (ref 0–200)
CO2 SERPL-SCNC: 29.7 MMOL/L (ref 22–29)
CREAT SERPL-MCNC: 1.09 MG/DL (ref 0.76–1.27)
GFR SERPLBLD CREATININE-BSD FMLA CKD-EPI: 65 ML/MIN/1.73
GFR SERPLBLD CREATININE-BSD FMLA CKD-EPI: 79 ML/MIN/1.73
GLOBULIN SER CALC-MCNC: 2.3 GM/DL
GLUCOSE SERPL-MCNC: 84 MG/DL (ref 65–99)
HDLC SERPL-MCNC: 78 MG/DL (ref 40–60)
LDLC SERPL CALC-MCNC: 65 MG/DL (ref 0–100)
LDLC/HDLC SERPL: 0.83 {RATIO}
POTASSIUM SERPL-SCNC: 4.4 MMOL/L (ref 3.5–5.2)
PROT SERPL-MCNC: 6.9 G/DL (ref 6–8.5)
SODIUM SERPL-SCNC: 146 MMOL/L (ref 136–145)
TRIGL SERPL-MCNC: 86 MG/DL (ref 0–150)
VLDLC SERPL CALC-MCNC: 17.2 MG/DL (ref 5–40)

## 2018-05-08 ENCOUNTER — TELEPHONE (OUTPATIENT)
Dept: INTERNAL MEDICINE | Facility: CLINIC | Age: 78
End: 2018-05-08

## 2018-05-08 NOTE — TELEPHONE ENCOUNTER
----- Message from Tin Esqueda MD sent at 5/7/2018 11:50 AM EDT -----  The labs were reviewed. Please inform patient that labs were normal.

## 2018-05-18 ENCOUNTER — OFFICE VISIT (OUTPATIENT)
Dept: FAMILY MEDICINE CLINIC | Facility: CLINIC | Age: 78
End: 2018-05-18

## 2018-05-18 VITALS
BODY MASS INDEX: 22.35 KG/M2 | HEIGHT: 72 IN | WEIGHT: 165 LBS | DIASTOLIC BLOOD PRESSURE: 82 MMHG | HEART RATE: 48 BPM | OXYGEN SATURATION: 96 % | SYSTOLIC BLOOD PRESSURE: 136 MMHG

## 2018-05-18 DIAGNOSIS — F02.80 DEMENTIA DUE TO PARKINSON'S DISEASE WITHOUT BEHAVIORAL DISTURBANCE (HCC): ICD-10-CM

## 2018-05-18 DIAGNOSIS — R00.1 SINUS BRADYCARDIA: Primary | ICD-10-CM

## 2018-05-18 DIAGNOSIS — R35.0 URINARY FREQUENCY: ICD-10-CM

## 2018-05-18 DIAGNOSIS — G20 DEMENTIA DUE TO PARKINSON'S DISEASE WITHOUT BEHAVIORAL DISTURBANCE (HCC): ICD-10-CM

## 2018-05-18 DIAGNOSIS — R33.9 URINARY RETENTION: ICD-10-CM

## 2018-05-18 DIAGNOSIS — R26.81 GAIT INSTABILITY: ICD-10-CM

## 2018-05-18 DIAGNOSIS — I10 ESSENTIAL HYPERTENSION: ICD-10-CM

## 2018-05-18 PROCEDURE — 99214 OFFICE O/P EST MOD 30 MIN: CPT | Performed by: NURSE PRACTITIONER

## 2018-05-18 RX ORDER — SULFAMETHOXAZOLE AND TRIMETHOPRIM 800; 160 MG/1; MG/1
1 TABLET ORAL 2 TIMES DAILY
Qty: 28 TABLET | Refills: 0 | Status: SHIPPED | OUTPATIENT
Start: 2018-05-18 | End: 2018-06-01

## 2018-05-18 NOTE — PATIENT INSTRUCTIONS
Discharge instructions  Bactrim twice a day ×14 days  Urinate every 2 hours    Follow-up urology to evaluate urine retention urgent continence  Stop amlodipine  Recheck blood pressure 1 week  Follow-up cardiology  Recheck  4-6 months

## 2018-05-22 NOTE — PROGRESS NOTES
Subjective   Aakash Martinez is a 78 y.o. male.     Follow-up your hypertension essential presently controlled  Dementia stable  Chronic gait, neurological problems balance likely Parkinson's or Parkinson's syndrome  Patient sees neurology.     Depression anxiety stable with Lexapro 5 mg      i recently transferred offices which was further for patient  He saw a new primary care provider, who was concerned regarding his balance and gait instability  In regards to his chronic bradycardia And blood-pressure medication  had referred patient for testing of cardiology evaluation.   Patient doesn't want to go back, he didn't feel like he needed the additional testing    Recently evaluated by cardiology  I believe  Had an echocardiogram  Medications were adjusted  He's down to 2.5 mg daily of Norvasc and I believe his wife had decreased him down to 2.5  Blood pressure is 136 of 82  He does report some orthostatic dizziness    Previously he was doing well with blood pressure  Without orthostatic symptoms   he had an extensive evaluation with cardiology  In 2013  He has had chronic Asymptomatic bradycardia however I see more recently has numbers in upper 40s and lower 50s    I reviewed his recent evaluations as well as cardiology consultation and lab testing  Reassured patient that it appears his doctors have done a good job working him up      Urinary frequency several weeks without fever chills, Urgency  No pain or hematuria      Hypertension          The following portions of the patient's history were reviewed and updated as appropriate: allergies, current medications, past family history, past medical history, past social history and problem list.    Review of Systems   Constitutional: Positive for fever.       Objective   Physical Exam   Constitutional: He is oriented to person, place, and time. He appears well-developed and well-nourished. No distress.   HENT:   Head: Normocephalic and atraumatic.   Nose: Nose normal.    Mouth/Throat: Oropharynx is clear and moist.   Eyes: Conjunctivae are normal. Pupils are equal, round, and reactive to light.   Neck: Neck supple.   Cardiovascular: Normal rate, regular rhythm and normal heart sounds.    No murmur heard.  Pulmonary/Chest: Effort normal and breath sounds normal. No respiratory distress. He has no wheezes.   Abdominal: Soft. Bowel sounds are normal. He exhibits no distension and no mass. There is no tenderness. There is no guarding. No hernia.   Musculoskeletal: He exhibits no edema or tenderness.   Balance problem shuffling gait   Lymphadenopathy:     He has no cervical adenopathy.   Neurological: He is alert and oriented to person, place, and time. No sensory deficit. He exhibits normal muscle tone. Coordination abnormal.   Difficulty expressing his thoughts  Short-term memory difficulty  Awareness to situation  Insight into his own  Illness  As well as his care   Skin: Skin is warm and dry. He is not diaphoretic.   Psychiatric: He has a normal mood and affect. His behavior is normal. Judgment and thought content normal.   Vitals reviewed.        Assessment/Plan   Aakash was seen today for hypertension.    Diagnoses and all orders for this visit:    Urinary frequency    Urinary retention    Gait instability    Essential hypertension    Dementia due to Parkinson's disease without behavioral disturbance    Other orders  -     sulfamethoxazole-trimethoprim (BACTRIM DS,SEPTRA DS) 800-160 MG per tablet; Take 1 tablet by mouth 2 (Two) Times a Day for 14 days.              Discharge instructions  Bactrim twice a day ×14 days  Urinate every 2 hours    Follow-up urology to evaluate urine retention urgent continence  Stop amlodipine  Recheck blood pressure 1 week  Follow-up cardiology  Recheck  4-6 months

## 2018-08-01 DIAGNOSIS — I25.10 CAD IN NATIVE ARTERY: ICD-10-CM

## 2018-08-01 RX ORDER — CLOPIDOGREL BISULFATE 75 MG/1
75 TABLET ORAL DAILY
Qty: 90 TABLET | Refills: 2 | Status: SHIPPED | OUTPATIENT
Start: 2018-08-01 | End: 2018-10-08 | Stop reason: SDUPTHER

## 2018-09-20 ENCOUNTER — OFFICE VISIT (OUTPATIENT)
Dept: FAMILY MEDICINE CLINIC | Facility: CLINIC | Age: 78
End: 2018-09-20

## 2018-09-20 VITALS
SYSTOLIC BLOOD PRESSURE: 187 MMHG | HEART RATE: 48 BPM | BODY MASS INDEX: 21.54 KG/M2 | OXYGEN SATURATION: 96 % | DIASTOLIC BLOOD PRESSURE: 83 MMHG | WEIGHT: 159 LBS | HEIGHT: 72 IN

## 2018-09-20 DIAGNOSIS — I10 ESSENTIAL HYPERTENSION: ICD-10-CM

## 2018-09-20 DIAGNOSIS — F03.90 DEMENTIA WITHOUT BEHAVIORAL DISTURBANCE, UNSPECIFIED DEMENTIA TYPE: Primary | ICD-10-CM

## 2018-09-20 PROCEDURE — 99213 OFFICE O/P EST LOW 20 MIN: CPT | Performed by: NURSE PRACTITIONER

## 2018-09-20 RX ORDER — AMLODIPINE BESYLATE 2.5 MG/1
2.5 TABLET ORAL DAILY
Qty: 30 TABLET | Refills: 1 | Status: SHIPPED | OUTPATIENT
Start: 2018-09-20 | End: 2018-12-20

## 2018-09-20 NOTE — PATIENT INSTRUCTIONS
Discharge instructions    Amlodipine  2.5 mg daily  Call Monday with blood pressure reading  Syncope chest pain shortness of breath slurred speech weakness emergency room    Reassurance to patient  Plenty of breaks for you during the week as well    Caution falls  Wondering  High risk such as stove fires etc.  Safe house    If taking amlodipine  And the top number less than than 110  May discontinue    If blood pressure not improving with amlodipine  Greater then 170 tomorrow    Increase amlodipine to 5 mg daily

## 2018-09-21 NOTE — PROGRESS NOTES
Subjective   Aakash Martinez is a 78 y.o. male.     Follow-up dementia, depression and anxiety  Possible Parkinsonian syndrome    Bradycardia asymptomatic  Patient sees Dr. Canela neurology  Dr. Aguilera cardiology  He is up to date with both providers  His wife reports over the last couple months he's had a deterioration  Increased dementia  Frequently does not recognize her, thanks sees his girlfriend  Doesn't know where his wife  No search speech vision loss weakness  He does have shuffling, gait difficulty which is slowly worsening as well  She doesn't think he had a stroke and she does not want any workup for stroke  And thinks it's related to his dementia  nameconstance doesn't think helps  Wants to know we should increase Lexapro?  He was agreeable to come to the office   has a good relationship with myself    Doesn't want to go to urology  Which I think is fine adopted would help at this time  Regarding incontinence issues    His blood pressure is quite elevated Today  I rechecked it manually 190/92 right arm sitting         The following portions of the patient's history were reviewed and updated as appropriate: allergies, current medications, past medical history, past social history, past surgical history and problem list.    Review of Systems   Respiratory: Negative for shortness of breath.    Cardiovascular: Negative for chest pain.   Musculoskeletal: Positive for gait problem.   Neurological: Positive for memory problem and confusion. Negative for dizziness, tremors, seizures, syncope, facial asymmetry, speech difficulty, weakness and headache.   All other systems reviewed and are negative.      Objective   Physical Exam   Constitutional: He is oriented to person, place, and time. He appears well-developed and well-nourished. No distress.   HENT:   Head: Normocephalic and atraumatic.   Nose: Nose normal.   Mouth/Throat: Oropharynx is clear and moist.   Speech clear   Eyes: Pupils are equal, round, and reactive  to light. Conjunctivae are normal. No scleral icterus.   Neck: Neck supple. No JVD present. No thyromegaly present.   Cardiovascular: Normal rate, regular rhythm and normal heart sounds.  Exam reveals no gallop and no friction rub.    No murmur heard.  Pulmonary/Chest: Effort normal and breath sounds normal. No respiratory distress. He has no wheezes. He has no rales.   Abdominal: Soft. Bowel sounds are normal. He exhibits no distension and no mass. There is no tenderness. There is no guarding. No hernia.   Musculoskeletal: He exhibits no edema or tenderness.   Sitting in wcno weakness   Lymphadenopathy:     He has no cervical adenopathy.   Neurological: He is alert and oriented to person, place, and time. He has normal reflexes. No cranial nerve deficit.   Skin: Skin is warm and dry. No rash noted. He is not diaphoretic. No erythema.   Psychiatric: Judgment normal.   Alert a person place not time or year  Able to give me simple answers  Does have some obvious confusion  Doesn't recognize wife  Does recognize me but cannot remember my name  Pleasant cooperative   Vitals reviewed.        Assessment/Plan   Aakash was seen today for check-up with medication refill.    Diagnoses and all orders for this visit:    Dementia without behavioral disturbance, unspecified dementia type    Essential hypertension                Do not recommend  Increasing Lexapro  No evidence of infection  No new urinary complaints to suggest you to the  Unable to get a urinalysis      Encouraged wife  Show me a break every week to develop a house  Care for the caretaker  Encouraged patient    Discharge instructions    Amlodipine  2.5 mg daily  Call Monday with blood pressure reading  Syncope chest pain shortness of breath slurred speech weakness emergency room    Reassurance to patient  Plenty of breaks for you during the week as well    Caution falls  Wondering  High risk such as stove fires etc.  Safe house    If taking amlodipine  And the  top number less than than 110  May discontinue    If blood pressure not improving with amlodipine  Greater then 170 tomorrow    Increase amlodipine to 5 mg daily      Caution with the addition of any blood pressure medication  We may need to discontinue  Communicate with me blood pressure readings    We need to avoid any overtreatment  May only need for a few days?  Is bradycardic asymptomatic  Will start with low dose 2.5 mg amlodipine with caution  Plenty of fluids  Chest pain shortness of breath weakness or speech syncope emergency room    Follow-up neurology cardiology

## 2018-09-24 ENCOUNTER — TELEPHONE (OUTPATIENT)
Dept: FAMILY MEDICINE CLINIC | Facility: CLINIC | Age: 78
End: 2018-09-24

## 2018-09-24 NOTE — TELEPHONE ENCOUNTER
Call from patient wife stating that Mr Martinez Systolic has not drop below 110.  She would like to increase bp medication to 5 mg if that's okay.      Please advise.

## 2018-09-24 NOTE — TELEPHONE ENCOUNTER
Increase it to amlodipine 5 mg a day    She can take 2  of the 2.5 mg tablets daily    Call me in 2 days blood pressure readings

## 2018-09-24 NOTE — TELEPHONE ENCOUNTER
Clarify if he's taken amlodipine 2.5 presently  Or 5 mg    What is his most recent blood pressure heart rate  Thank you

## 2018-10-01 ENCOUNTER — TELEPHONE (OUTPATIENT)
Dept: FAMILY MEDICINE CLINIC | Facility: CLINIC | Age: 78
End: 2018-10-01

## 2018-10-01 NOTE — TELEPHONE ENCOUNTER
Blood pressure looks good  Continue same dose  Check blood pressure weekly  If top #110 or less  Hold amlodipine    Make sure she talks to neurology as well (CALL)  See if he can try Parkinson's medication again?

## 2018-10-05 ENCOUNTER — TELEPHONE (OUTPATIENT)
Dept: FAMILY MEDICINE CLINIC | Facility: CLINIC | Age: 78
End: 2018-10-05

## 2018-10-05 NOTE — TELEPHONE ENCOUNTER
Patient's wife called in regards to three prescriptions that were to be sent to [facility].  I told her that I did not see that they were sent, or that there was a request from the pharmacy.  Advised patient to call office on Monday.  She expressed understanding.

## 2018-10-08 DIAGNOSIS — E78.00 HYPERCHOLESTEROLEMIA: ICD-10-CM

## 2018-10-08 DIAGNOSIS — I25.10 CAD IN NATIVE ARTERY: ICD-10-CM

## 2018-10-08 DIAGNOSIS — F32.A DEPRESSION, UNSPECIFIED DEPRESSION TYPE: ICD-10-CM

## 2018-10-08 RX ORDER — CLOPIDOGREL BISULFATE 75 MG/1
75 TABLET ORAL DAILY
Qty: 90 TABLET | Refills: 2 | Status: SHIPPED | OUTPATIENT
Start: 2018-10-08

## 2018-10-08 RX ORDER — ESCITALOPRAM OXALATE 5 MG/1
5 TABLET ORAL DAILY
Qty: 90 TABLET | Refills: 2 | Status: SHIPPED | OUTPATIENT
Start: 2018-10-08 | End: 2018-12-20 | Stop reason: SDUPTHER

## 2018-10-08 RX ORDER — PRAVASTATIN SODIUM 40 MG
40 TABLET ORAL DAILY
Qty: 90 TABLET | Refills: 2 | Status: SHIPPED | OUTPATIENT
Start: 2018-10-08

## 2018-12-20 ENCOUNTER — OFFICE VISIT (OUTPATIENT)
Dept: FAMILY MEDICINE CLINIC | Facility: CLINIC | Age: 78
End: 2018-12-20

## 2018-12-20 VITALS
HEIGHT: 72 IN | HEART RATE: 50 BPM | BODY MASS INDEX: 21.56 KG/M2 | SYSTOLIC BLOOD PRESSURE: 167 MMHG | DIASTOLIC BLOOD PRESSURE: 80 MMHG | OXYGEN SATURATION: 96 %

## 2018-12-20 DIAGNOSIS — I10 ESSENTIAL HYPERTENSION: Primary | ICD-10-CM

## 2018-12-20 DIAGNOSIS — F32.A DEPRESSION, UNSPECIFIED DEPRESSION TYPE: ICD-10-CM

## 2018-12-20 DIAGNOSIS — F02.818 DEMENTIA ASSOCIATED WITH OTHER UNDERLYING DISEASE WITH BEHAVIORAL DISTURBANCE (HCC): ICD-10-CM

## 2018-12-20 DIAGNOSIS — R26.81 GAIT INSTABILITY: ICD-10-CM

## 2018-12-20 PROCEDURE — 99214 OFFICE O/P EST MOD 30 MIN: CPT | Performed by: NURSE PRACTITIONER

## 2018-12-20 RX ORDER — ESCITALOPRAM OXALATE 5 MG/1
7.5 TABLET ORAL DAILY
Qty: 135 TABLET | Refills: 1
Start: 2018-12-20

## 2018-12-20 RX ORDER — AMLODIPINE BESYLATE 5 MG/1
5 TABLET ORAL DAILY
Qty: 90 TABLET | Refills: 1
Start: 2018-12-20

## 2018-12-28 PROBLEM — F02.818 DEMENTIA ASSOCIATED WITH OTHER UNDERLYING DISEASE WITH BEHAVIORAL DISTURBANCE (HCC): Status: ACTIVE | Noted: 2017-12-11

## 2018-12-28 NOTE — PROGRESS NOTES
Subjective   Aakash Martinez is a 78 y.o. male.     Follow-up hypertension presently elevated somewhatAverages in the 160s  sinus bradycardia  I seen cardiology in the pastNo changes  No chest pain shortness of breath Syncope  He has significant gait difficulty Which has progressed over the last several years  Neurology Dr. Sami Canela does not think he has Parkinson's  Or Parkinson's syndrome?/  She will follow up in neurology for this  He's has problems with increased irritability  Increased confusion consistent with dementia  Requires 24 our care  Gets out of bed at night time  We discussed  Environmental changes  Safety  Caregiver stress  Shared family responsibility    Wife is not convinced and she wants to start Parkinson's medicine again         The following portions of the patient's history were reviewed and updated as appropriate: allergies, current medications, past family history, past medical history, past social history, past surgical history and problem list.    Review of Systems   Constitutional: Negative for fatigue and fever.   HENT: Negative.  Negative for trouble swallowing.    Eyes: Negative.    Respiratory: Negative.  Negative for cough and shortness of breath.    Cardiovascular: Negative for chest pain, palpitations and leg swelling.   Gastrointestinal: Negative.  Negative for abdominal pain.   Genitourinary: Negative.    Musculoskeletal:        Gait difficulty   Skin: Negative.    Neurological: Positive for memory problem. Negative for dizziness, tremors, seizures, speech difficulty, weakness, headache and confusion.   Psychiatric/Behavioral: Positive for agitation, behavioral problems, sleep disturbance and depressed mood. Negative for suicidal ideas and stress. The patient is nervous/anxious.        Objective   Physical Exam   Constitutional: He is oriented to person, place, and time. He appears well-developed and well-nourished. No distress.   HENT:   Head: Normocephalic and atraumatic.    Nose: Nose normal.   Mouth/Throat: Oropharynx is clear and moist.   Eyes: Conjunctivae are normal. Pupils are equal, round, and reactive to light. No scleral icterus.   Neck: Neck supple. No JVD present. No thyromegaly present.   Cardiovascular: Normal rate, regular rhythm and normal heart sounds. Exam reveals no gallop and no friction rub.   No murmur heard.  Pulmonary/Chest: Effort normal and breath sounds normal. No respiratory distress. He has no wheezes. He has no rales.   Abdominal: Soft. Bowel sounds are normal. He exhibits no distension and no mass. There is no tenderness. There is no guarding. No hernia.   Musculoskeletal: He exhibits no edema or tenderness.   Sitting in wheelchair  Great difficulty standing  Shuffles step to stop poor posture  Not able to go but a few feet without assistance   Lymphadenopathy:     He has no cervical adenopathy.   Neurological: He is alert and oriented to person, place, and time. He has normal reflexes. No cranial nerve deficit. He exhibits normal muscle tone. Coordination abnormal.   Oriented person  Doctor's office  Recognizes me does not know my name  Does not know who his wife is     Skin: Skin is warm and dry. No rash noted. He is not diaphoretic. No erythema.   Psychiatric: He has a normal mood and affect. His behavior is normal. Judgment and thought content normal.   Vitals reviewed.        Assessment/Plan   Aakash was seen today for med refill.    Diagnoses and all orders for this visit:    Essential hypertension    Depression, unspecified depression type  -     escitalopram (LEXAPRO) 5 MG tablet; Take 1.5 tablets by mouth Daily. For depression    Dementia associated with other underlying disease with behavioral disturbance    Gait instability    Other orders  -     amLODIPine (NORVASC) 5 MG tablet; Take 1 tablet by mouth Daily. For blood pressure. Stop if Systolic is less than 110.            Recheck in one month  Follow-up neurology to discuss diagnosis and  anything that may help gate  Consider resuming physical therapy  Additional help from the family caregiving  Caregiver stress strategy  Altering the environment  Safety tips  Alarms    Office visit 30 minute

## 2019-01-15 ENCOUNTER — TELEPHONE (OUTPATIENT)
Dept: FAMILY MEDICINE CLINIC | Facility: CLINIC | Age: 79
End: 2019-01-15

## 2019-01-15 NOTE — TELEPHONE ENCOUNTER
TOOTIE     Burgos Juan called on behalf of Mr. Martinez stating he is getting worst, he has become combative awaken in the middle of the night and fall, on his medication so she talk to his neurologist and they are going to have palliative care come in and evaluate him. She had to cancel his appointment on 1/18/2019 and move it to 2/12/19 so she can get things taken care of.

## 2019-01-18 ENCOUNTER — TELEPHONE (OUTPATIENT)
Dept: FAMILY MEDICINE CLINIC | Facility: CLINIC | Age: 79
End: 2019-01-18

## 2019-01-18 NOTE — TELEPHONE ENCOUNTER
Ms. Martinez wanted you to know that Mr. Martinez is going to [facility].  She is needing a H & P for him to be faxed to    Charlie Montgomery at [phone]

## 2019-01-21 ENCOUNTER — APPOINTMENT (OUTPATIENT)
Dept: CT IMAGING | Facility: HOSPITAL | Age: 79
End: 2019-01-21

## 2019-01-21 ENCOUNTER — HOSPITAL ENCOUNTER (EMERGENCY)
Facility: HOSPITAL | Age: 79
Discharge: HOME | End: 2019-01-22
Attending: EMERGENCY MEDICINE | Admitting: EMERGENCY MEDICINE

## 2019-01-21 DIAGNOSIS — F03.90 DEMENTIA WITHOUT BEHAVIORAL DISTURBANCE, UNSPECIFIED DEMENTIA TYPE: ICD-10-CM

## 2019-01-21 DIAGNOSIS — R29.6 UNWITNESSED FALL: Primary | ICD-10-CM

## 2019-01-21 PROCEDURE — 70450 CT HEAD/BRAIN W/O DYE: CPT

## 2019-01-21 PROCEDURE — 25010000002 LORAZEPAM PER 2 MG: Performed by: EMERGENCY MEDICINE

## 2019-01-21 PROCEDURE — 96372 THER/PROPH/DIAG INJ SC/IM: CPT

## 2019-01-21 PROCEDURE — 99284 EMERGENCY DEPT VISIT MOD MDM: CPT

## 2019-01-21 RX ORDER — LORAZEPAM 2 MG/ML
1 INJECTION INTRAMUSCULAR ONCE
Status: DISCONTINUED | OUTPATIENT
Start: 2019-01-21 | End: 2019-01-21

## 2019-01-21 RX ORDER — MIDAZOLAM HYDROCHLORIDE 1 MG/ML
1 INJECTION INTRAMUSCULAR; INTRAVENOUS ONCE
Status: DISCONTINUED | OUTPATIENT
Start: 2019-01-21 | End: 2019-01-21

## 2019-01-21 RX ORDER — LORAZEPAM 2 MG/ML
1 INJECTION INTRAMUSCULAR ONCE
Status: COMPLETED | OUTPATIENT
Start: 2019-01-21 | End: 2019-01-21

## 2019-01-21 RX ADMIN — LORAZEPAM 1 MG: 2 INJECTION INTRAMUSCULAR; INTRAVENOUS at 22:06

## 2019-01-22 VITALS
BODY MASS INDEX: 21.44 KG/M2 | SYSTOLIC BLOOD PRESSURE: 131 MMHG | HEIGHT: 72 IN | OXYGEN SATURATION: 96 % | WEIGHT: 158.3 LBS | DIASTOLIC BLOOD PRESSURE: 97 MMHG | HEART RATE: 75 BPM | RESPIRATION RATE: 18 BRPM | TEMPERATURE: 97.6 F

## 2019-01-22 NOTE — ED NOTES
Pt unable to unable to cooperate to get CT done, MD Atwood notified, new order for Ativan 1mg IM      Debbie Montenegro, RN  01/21/19 4001

## 2019-01-22 NOTE — ED PROVIDER NOTES
EMERGENCY DEPARTMENT ENCOUNTER    CHIEF COMPLAINT  Chief Complaint: AMS  History given by: pt  History limited by: dementia  Room Number: 24/24  PMD: Epley, James, APRN      HPI:  Pt is a 78 y.o. male who presents to the ED with reported bilateral shoulder pain after an unwitnessed fall at NH earlier tonight. NH does not report the pt having any recent illness. Pt is unable to provide any history.    Duration:  unknown  Onset: gradual  Timing: constant  Intensity/Severity: unknown  Progression: unknown  Associated Symptoms: unknown  Aggravating Factors: unknown  Alleviating Factors: unknown  Previous Episodes: unknown  Treatment before arrival: none    PAST MEDICAL HISTORY  Active Ambulatory Problems     Diagnosis Date Noted   • Abnormal computerized tomography of brain 01/27/2016   • Cognitive complaints 01/27/2016   • Mixed anxiety depressive disorder 01/27/2016   • Hypertension 01/27/2016   • Obstructive sleep apnea syndrome 01/27/2016   • Parkinsonism (CMS/[facility]) 01/27/2016   • Depression 01/28/2016   • Fatigue 01/28/2016   • Memory impairment 01/28/2016   • Peripheral neuropathy 01/28/2016   • Sleep disorder 01/28/2016   • Hypercholesterolemia 10/13/2016   • Gait instability 05/15/2017   • Falls 05/15/2017   • Dementia due to Parkinson's disease without behavioral disturbance (CMS/[facility]) 10/23/2017   • Dementia associated with other underlying disease with behavioral disturbance 12/11/2017   • CAD in native artery 01/31/2018   • Laceration of right hand without foreign body 03/09/2018   • Contusion of right hand 03/09/2018   • Right hand pain 04/23/2018     Resolved Ambulatory Problems     Diagnosis Date Noted   • Angioedema 01/27/2016     Past Medical History:   Diagnosis Date   • Acid reflux    • Alzheimer disease    • Angioedema 1/27/2016   • Aortic sclerosis    • Arthritis    • Dementia    • Depression    • ED (erectile dysfunction) of non-organic origin    • Hypercholesterolemia    • Hypertension    • Renal  colic, bilateral    • Stroke (CMS/HCC)        PAST SURGICAL HISTORY  Past Surgical History:   Procedure Laterality Date   • HERNIA REPAIR Left     inguinal sliding, left with mesh   • NECK SURGERY     • OTHER SURGICAL HISTORY      RENAL LITHOTRIPSY   • TONSILLECTOMY         FAMILY HISTORY  Family History   Problem Relation Age of Onset   • Hypertension Mother    • Stroke Mother    • Parkinsonism Father    • Pneumonia Father        SOCIAL HISTORY  Social History     Socioeconomic History   • Marital status:      Spouse name: Loretta   • Number of children: 3   • Years of education: Not on file   • Highest education level: Not on file   Social Needs   • Financial resource strain: Not on file   • Food insecurity - worry: Not on file   • Food insecurity - inability: Not on file   • Transportation needs - medical: Not on file   • Transportation needs - non-medical: Not on file   Occupational History   • Not on file   Tobacco Use   • Smoking status: Former Smoker   • Smokeless tobacco: Never Used   • Tobacco comment: 17 PK YEARS   Substance and Sexual Activity   • Alcohol use: No     Comment: STOPPED DRINKING, [employer] IN [employer]   • Drug use: No   • Sexual activity: Yes     Partners: Female     Birth control/protection: None   Other Topics Concern   • Not on file   Social History Narrative   • Not on file       ALLERGIES  Penicillins; Aspirin; Lisinopril; Naproxen; and Nsaids    REVIEW OF SYSTEMS  Review of Systems   Unable to perform ROS: Dementia   Musculoskeletal: Positive for arthralgias (bilateral shoulders, per NH).       PHYSICAL EXAM  ED Triage Vitals   Temp Heart Rate Resp BP SpO2   01/21/19 2000 01/21/19 2000 01/21/19 2000 01/21/19 2000 01/21/19 2000   97.6 °F (36.4 °C) 87 18 (!) 170/102 97 %      Temp src Heart Rate Source Patient Position BP Location FiO2 (%)   -- -- 01/21/19 2111 01/21/19 2111 --     Sitting Right arm        Physical Exam   Constitutional: No distress.   Pt appears elderly and  frail   HENT:   Head: Normocephalic and atraumatic.   Eyes: EOM are normal.   Pt's pupils are irregular   Neck: Normal range of motion. Neck supple.   Cardiovascular: Normal rate, regular rhythm and normal heart sounds.   Pulmonary/Chest: Effort normal and breath sounds normal. No respiratory distress. He exhibits no tenderness.   Abdominal: Soft. There is no tenderness. There is no rebound and no guarding.   Musculoskeletal: Normal range of motion. He exhibits no edema.   No bony tenderness in BUE   Neurological: He is alert. He is disoriented.   Skin: Skin is warm and dry.   old bruises on bilateral legs and arms in various stages of healing   Nursing note and vitals reviewed.      RADIOLOGY  CT Head Without Contrast   Final Result   No acute intracranial process identified.       Radiation dose reduction techniques were utilized, including automated   exposure control and exposure modulation based on body size.       This report was finalized on 1/21/2019 11:25 PM by Dr. Jeaneth Patel M.D.               I ordered the above noted radiological studies. Interpreted by radiologist. Reviewed by me in PACS.       PROCEDURES  Procedures      PROGRESS AND CONSULTS      2138- Ordered CT Head for further evaluation.    2154- Per RN, the pt is too agitated to obtain the CT head at this time.  Ordered Ativan for anxiety.    2333- Pt's CT Head shows nothing acute. I will discharge the pt back to his NH>    MEDICAL DECISION MAKING  Results were reviewed/discussed with the patient and they were also made aware of online access. Pt also made aware that follow up with PMD is necessary.     MDM  Number of Diagnoses or Management Options  Dementia without behavioral disturbance, unspecified dementia type:   Unwitnessed fall:      Amount and/or Complexity of Data Reviewed  Tests in the radiology section of CPT®: reviewed and ordered (CT Head shows nothing acute)  Independent visualization of images, tracings, or specimens:  yes    Patient Progress  Patient progress: stable         DIAGNOSIS  Final diagnoses:   Unwitnessed fall   Dementia without behavioral disturbance, unspecified dementia type       DISPOSITION  DISCHARGE    Patient discharged in stable condition.    Reviewed implications of results, diagnosis, meds, responsibility to follow up, warning signs and symptoms of possible worsening, potential complications and reasons to return to ER.  Patient/Family voiced understanding of above instructions.    Discussed plan for discharge, as there is no emergent indication for admission. Patient referred to primary care provider for BP management due to today's BP. Pt/family is agreeable and understands need for follow up and repeat testing.  Pt is aware that discharge does not mean that nothing is wrong but it indicates no emergency is present that requires admission and they must continue care with follow-up as given below or physician of their choice.     FOLLOW-UP  Epley, James, SAEID  [address]  [place] [id number] [phone]               Medication List      No changes were made to your prescriptions during this visit.           Latest Documented Vital Signs:  As of 3:45 AM  BP- 131/97 HR- 75 Temp- 97.6 °F (36.4 °C) O2 sat- 96%    --  Documentation assistance provided by chris Lyles and Cristin Correa for Dr. Atwood.  Information recorded by the scribe was done at my direction and has been verified and validated by me.     Cristin Jackson  01/21/19 [id number]       Viky Lyles  01/22/19 2032       Ashok Atwood MD  01/22/19 0708

## 2019-01-22 NOTE — ED NOTES
Report called to Selena at [facility].     Debbie Montenegro, RN  01/22/19 0031

## 2019-01-22 NOTE — ED NOTES
"Spoke w/ wife via phone: Wife reports she took the pt off all medications including his blood thinners a week and a half ago bc \"he wasn't getting any better\" and \"he falls a lot\" the blood thinners were bruising his skin to much. Wife reports she monitors his blood pressure and heart rate at home, his heart rate usually runs between 48-53. Wife reports pt has dementia & pt is completely disoriented, normally lives at home and today was his first day at the nursing home.      Debbie Montenegro, RN  01/21/19 9085    "

## 2019-01-29 PROCEDURE — 80048 BASIC METABOLIC PNL TOTAL CA: CPT

## 2019-01-29 PROCEDURE — 85025 COMPLETE CBC W/AUTO DIFF WBC: CPT

## 2019-01-30 ENCOUNTER — LAB REQUISITION (OUTPATIENT)
Dept: LAB | Facility: HOSPITAL | Age: 79
End: 2019-01-30

## 2019-01-30 DIAGNOSIS — Z00.00 ROUTINE GENERAL MEDICAL EXAMINATION AT A HEALTH CARE FACILITY: ICD-10-CM

## 2019-01-30 LAB
ANION GAP SERPL CALCULATED.3IONS-SCNC: 14.4 MMOL/L
BASOPHILS # BLD AUTO: 0 10*3/MM3 (ref 0–0.2)
BASOPHILS NFR BLD AUTO: 0 % (ref 0–1.5)
BUN BLD-MCNC: 30 MG/DL (ref 8–23)
BUN/CREAT SERPL: 29.7 (ref 7–25)
CALCIUM SPEC-SCNC: 10.4 MG/DL (ref 8.6–10.5)
CHLORIDE SERPL-SCNC: 101 MMOL/L (ref 98–107)
CO2 SERPL-SCNC: 30.6 MMOL/L (ref 22–29)
CREAT BLD-MCNC: 1.01 MG/DL (ref 0.76–1.27)
DEPRECATED RDW RBC AUTO: 46.7 FL (ref 37–54)
EOSINOPHIL # BLD AUTO: 0 10*3/MM3 (ref 0–0.7)
EOSINOPHIL NFR BLD AUTO: 0 % (ref 0.3–6.2)
ERYTHROCYTE [DISTWIDTH] IN BLOOD BY AUTOMATED COUNT: 13 % (ref 11.5–14.5)
GFR SERPL CREATININE-BSD FRML MDRD: 71 ML/MIN/1.73
GLUCOSE BLD-MCNC: 143 MG/DL (ref 65–99)
HCT VFR BLD AUTO: 49.4 % (ref 40.4–52.2)
HGB BLD-MCNC: 15.7 G/DL (ref 13.7–17.6)
IMM GRANULOCYTES # BLD AUTO: 0.02 10*3/MM3 (ref 0–0.03)
IMM GRANULOCYTES NFR BLD AUTO: 0.2 % (ref 0–0.5)
LYMPHOCYTES # BLD AUTO: 0.67 10*3/MM3 (ref 0.9–4.8)
LYMPHOCYTES NFR BLD AUTO: 6.5 % (ref 19.6–45.3)
MCH RBC QN AUTO: 31.3 PG (ref 27–32.7)
MCHC RBC AUTO-ENTMCNC: 31.8 G/DL (ref 32.6–36.4)
MCV RBC AUTO: 98.4 FL (ref 79.8–96.2)
MONOCYTES # BLD AUTO: 0.26 10*3/MM3 (ref 0.2–1.2)
MONOCYTES NFR BLD AUTO: 2.5 % (ref 5–12)
NEUTROPHILS # BLD AUTO: 9.4 10*3/MM3 (ref 1.9–8.1)
NEUTROPHILS NFR BLD AUTO: 90.8 % (ref 42.7–76)
PLATELET # BLD AUTO: 303 10*3/MM3 (ref 140–500)
PMV BLD AUTO: 12 FL (ref 6–12)
POTASSIUM BLD-SCNC: 4.3 MMOL/L (ref 3.5–5.2)
RBC # BLD AUTO: 5.02 10*6/MM3 (ref 4.6–6)
SODIUM BLD-SCNC: 146 MMOL/L (ref 136–145)
WBC NRBC COR # BLD: 10.35 10*3/MM3 (ref 4.5–10.7)

## 2021-03-15 ENCOUNTER — BULK ORDERING (OUTPATIENT)
Dept: CASE MANAGEMENT | Facility: OTHER | Age: 81
End: 2021-03-15

## 2021-03-15 DIAGNOSIS — Z23 IMMUNIZATION DUE: ICD-10-CM
